# Patient Record
Sex: FEMALE | Race: WHITE | NOT HISPANIC OR LATINO | Employment: OTHER | URBAN - METROPOLITAN AREA
[De-identification: names, ages, dates, MRNs, and addresses within clinical notes are randomized per-mention and may not be internally consistent; named-entity substitution may affect disease eponyms.]

---

## 2017-06-15 ENCOUNTER — OFFICE VISIT (OUTPATIENT)
Dept: NEUROSURGERY | Facility: CLINIC | Age: 82
End: 2017-06-15

## 2017-06-15 VITALS
WEIGHT: 116.9 LBS | HEIGHT: 60 IN | DIASTOLIC BLOOD PRESSURE: 71 MMHG | HEART RATE: 61 BPM | SYSTOLIC BLOOD PRESSURE: 146 MMHG | BODY MASS INDEX: 22.95 KG/M2

## 2017-06-15 DIAGNOSIS — G50.0 IDIOPATHIC TRIGEMINAL NEURALGIA: ICD-10-CM

## 2017-06-15 DIAGNOSIS — G50.0 IDIOPATHIC TRIGEMINAL NEURALGIA: Primary | ICD-10-CM

## 2017-06-15 LAB
ALBUMIN SERPL-MCNC: 3.8 G/DL (ref 3.4–5)
ALP SERPL-CCNC: 71 U/L (ref 40–150)
ALT SERPL W P-5'-P-CCNC: 19 U/L (ref 0–50)
ANION GAP SERPL CALCULATED.3IONS-SCNC: 7 MMOL/L (ref 3–14)
AST SERPL W P-5'-P-CCNC: 18 U/L (ref 0–45)
BASOPHILS # BLD AUTO: 0 10E9/L (ref 0–0.2)
BASOPHILS NFR BLD AUTO: 0.4 %
BILIRUB SERPL-MCNC: 0.6 MG/DL (ref 0.2–1.3)
BUN SERPL-MCNC: 12 MG/DL (ref 7–30)
CALCIUM SERPL-MCNC: 8.8 MG/DL (ref 8.5–10.1)
CHLORIDE SERPL-SCNC: 105 MMOL/L (ref 94–109)
CO2 SERPL-SCNC: 28 MMOL/L (ref 20–32)
CREAT SERPL-MCNC: 0.86 MG/DL (ref 0.52–1.04)
DIFFERENTIAL METHOD BLD: NORMAL
EOSINOPHIL # BLD AUTO: 0.1 10E9/L (ref 0–0.7)
EOSINOPHIL NFR BLD AUTO: 2 %
ERYTHROCYTE [DISTWIDTH] IN BLOOD BY AUTOMATED COUNT: 12 % (ref 10–15)
GFR SERPL CREATININE-BSD FRML MDRD: 63 ML/MIN/1.7M2
GLUCOSE SERPL-MCNC: 88 MG/DL (ref 70–99)
HCT VFR BLD AUTO: 41.1 % (ref 35–47)
HGB BLD-MCNC: 13.6 G/DL (ref 11.7–15.7)
IMM GRANULOCYTES # BLD: 0 10E9/L (ref 0–0.4)
IMM GRANULOCYTES NFR BLD: 0.2 %
LYMPHOCYTES # BLD AUTO: 1.4 10E9/L (ref 0.8–5.3)
LYMPHOCYTES NFR BLD AUTO: 31.5 %
MCH RBC QN AUTO: 29.8 PG (ref 26.5–33)
MCHC RBC AUTO-ENTMCNC: 33.1 G/DL (ref 31.5–36.5)
MCV RBC AUTO: 90 FL (ref 78–100)
MONOCYTES # BLD AUTO: 0.4 10E9/L (ref 0–1.3)
MONOCYTES NFR BLD AUTO: 9.2 %
NEUTROPHILS # BLD AUTO: 2.5 10E9/L (ref 1.6–8.3)
NEUTROPHILS NFR BLD AUTO: 56.7 %
NRBC # BLD AUTO: 0 10*3/UL
NRBC BLD AUTO-RTO: 0 /100
PLATELET # BLD AUTO: 176 10E9/L (ref 150–450)
POTASSIUM SERPL-SCNC: 4.2 MMOL/L (ref 3.4–5.3)
PROT SERPL-MCNC: 7 G/DL (ref 6.8–8.8)
RBC # BLD AUTO: 4.57 10E12/L (ref 3.8–5.2)
SODIUM SERPL-SCNC: 140 MMOL/L (ref 133–144)
WBC # BLD AUTO: 4.5 10E9/L (ref 4–11)

## 2017-06-15 RX ORDER — ASPIRIN 81 MG/1
81 TABLET, CHEWABLE ORAL DAILY
COMMUNITY
Start: 2015-08-04 | End: 2017-06-15

## 2017-06-15 RX ORDER — BACLOFEN 10 MG/1
5 TABLET ORAL 2 TIMES DAILY
COMMUNITY
Start: 2015-08-04 | End: 2017-06-15

## 2017-06-15 RX ORDER — OXCARBAZEPINE 150 MG/1
150 TABLET, FILM COATED ORAL DAILY
COMMUNITY
Start: 2015-08-04 | End: 2017-06-15

## 2017-06-15 RX ORDER — CARBAMAZEPINE 200 MG/1
100 TABLET ORAL 2 TIMES DAILY
Qty: 90 TABLET | Refills: 1 | Status: SHIPPED | OUTPATIENT
Start: 2017-06-15 | End: 2019-09-12 | Stop reason: DRUGHIGH

## 2017-06-15 RX ORDER — ONDANSETRON 4 MG/1
4 TABLET, ORALLY DISINTEGRATING ORAL 3 TIMES DAILY PRN
COMMUNITY
Start: 2015-08-04 | End: 2017-06-15

## 2017-06-15 ASSESSMENT — ENCOUNTER SYMPTOMS
NUMBNESS: 0
SEIZURES: 0
SPEECH CHANGE: 0
MEMORY LOSS: 0
HEADACHES: 1
PARALYSIS: 0
LOSS OF CONSCIOUSNESS: 0
TINGLING: 0
WEAKNESS: 0
DIZZINESS: 1
DISTURBANCES IN COORDINATION: 0

## 2017-06-15 ASSESSMENT — PAIN SCALES - GENERAL: PAINLEVEL: EXTREME PAIN (8)

## 2017-06-15 NOTE — MR AVS SNAPSHOT
After Visit Summary   6/15/2017    Tonia Meeks    MRN: [id number]           Patient Information     Date Of Birth          4/24/1934        Visit Information        Provider Department      6/15/2017 9:00 AM Kailee Acuna APRN CNP [facility] Neurosurgery        Today's Diagnoses     Idiopathic trigeminal neuralgia    -  1      Care Instructions    1. Please complete your lab work today on the first floor.     2. Please start your Tegretol and call [facility] care coordinator in 4 days. [phone].     3. Please follow-up in clinic in 4 weeks.           Follow-ups after your visit        Follow-up notes from your care team     Return in about 4 weeks (around 7/13/2017).      Your next 10 appointments already scheduled     Bruno 15, 2017 10:30 AM CDT   LAB with [facility])    [address] [place] [id number] [phone]           Patient must bring picture ID.  Patient should be prepared to give a urine specimen  Please do not eat 10-12 hours before your appointment if you are coming in fasting for labs on lipids, cholesterol, or glucose (sugar).  Pregnant women should follow their Care Team instructions. Water with medications is okay. Do not drink coffee or other fluids.   If you have concerns about taking  your medications, please ask at office or if scheduling via [employer], send a message by clicking on Secure Messaging, Message Your Care Team.            Jul 12, 2017 10:00 AM CDT   (Arrive by 9:45 AM)   Return Visit with KENYA Garcia CNP   [facility]surgery ([facility])    [address] [place] [id number] [phone]              Future tests that were ordered for you today     Open Future Orders        Priority Expected Expires Ordered    CBC with platelets differential Routine  6/15/2018 6/15/2017    Comprehensive metabolic panel Routine   6/15/2018 6/15/2017            Who to contact     Please call your clinic at [phone] to:    Ask questions about your health    Make or cancel appointments    Discuss your medicines    Learn about your test results    Speak to your doctor   If you have compliments or concerns about an experience at your clinic, or if you wish to file a complaint, please contact [facility] Patient Relations at [phone] or email us at [email or web address]         Additional Information About Your Visit        [employer] Information     [employer] is an electronic gateway that provides easy, online access to your medical records. With [employer], you can request a clinic appointment, read your test results, renew a prescription or communicate with your care team.     To sign up for [employer] visit the website at [email or web address]   You will be asked to enter the access code listed below, as well as some personal information. Please follow the directions to create your username and password.     Your access code is: [email or web address]  Expires: 9/10/2017  6:31 AM     Your access code will  in 90 days. If you need help or a new code, please contact your [facility] or call [phone] for assistance.        Care EveryWhere ID     This is your Care EveryWhere ID. This could be used by other organizations to access your [facility] medical records  [id number]        Your Vitals Were     Pulse Height BMI (Body Mass Index)             61 1.524 m (5') 22.83 kg/m2          Blood Pressure from Last 3 Encounters:   06/15/17 146/71   12/17/15 156/72   12/15/15 179/73    Weight from Last 3 Encounters:   06/15/17 53 kg (116 lb 14.4 oz)   12/17/15 55.2 kg (121 lb 11.1 oz)   12/15/15 55.2 kg (121 lb 9.6 oz)                 Today's Medication Changes          These changes are accurate as of: 6/15/17 10:18 AM.  If you have any questions, ask your nurse or doctor.                Start taking these medicines.        Dose/Directions    carBAMazepine 200 MG tablet   Commonly known as:  TEGretol   Used for:  Idiopathic trigeminal neuralgia   Started by:  Kailee Acuna APRN CNP        Dose:  100 mg   Take 0.5 tablets (100 mg) by mouth 2 times daily   Quantity:  90 tablet   Refills:  1            Where to get your medicines      These medications were [place] [facility] [place] - [address]  [address], [facility] [address]     Phone:  [phone]     carBAMazepine 200 MG tablet                Primary Care Provider    None Specified       No primary provider on file.        Thank you!     Thank you for choosing [facility]  for your care. Our goal is always to provide you with excellent care. Hearing back from our patients is one way we can continue to improve our services. Please take a few minutes to complete the written survey that you may receive in the mail after your visit with us. Thank you!             Your Updated Medication List - Protect others around you: Learn how to safely use, store and throw away your medicines at www.disposemymeds.org.          This list is accurate as of: 6/15/17 10:18 AM.  Always use your most recent med list.                   Brand Name Dispense Instructions for use    ATORVASTATIN CALCIUM PO      Take by mouth daily       carBAMazepine 200 MG tablet    TEGretol    90 tablet    Take 0.5 tablets (100 mg) by mouth 2 times daily       CLOPIDOGREL BISULFATE PO      Take by mouth daily       LISINOPRIL PO      Take by mouth daily       METOPROLOL TARTRATE PO      Take by mouth daily

## 2017-06-15 NOTE — PROGRESS NOTES
Neurosurgery Progress Note    Tonia Meeks MRN# [id number]   YOB: 1934 Age: 83 year old   Date of Service: 06/15/17       I had the pleasure of seeing Tonia Meeks and her  in neurosurgery clinic at her request regarding her recurrent right-sided V1 trigeminal neuralgia (TN).    This is a 83 year old female, who was initially referred to our office by her PCP, Shari Padron MD for idiopathic right-sided trigeminal neuralgia in 12/2015. She was suffering from the condition since about 2010 and eventually failed pharmacologic management. She saw Dr. Salas and electively underwent balloon compression on 12/17/2015. She had excellent relief from the procedure until about a week ago when the paroxysmal lancinating pain reoccurred in the frontal region of her head and lateral to her right eye. Triggers include chewing, sneezing, yawning and just about any facial movements. She denies residual facial numbness or alteration in sensation in the right eye subsequent to balloon compression. She denies pain in the right eye and/or constant background pain in the right side of her face. She said she took a fall down the stairs at home in 9/2016 and sustained left rigt fracture, thoracic spine (T3-T6), and right wrist fractures. In addition, she developed MI and was found to have CAD and underwent cardiac stent as well. She denies LOC and does not remeber if she has had an imaging done for her brain since she had many tests/ studies done after the mechanical fall. She was hospitalized at [facility] for 2 weeks for all above workup and surgeries and was in rehab for a while before she was d/c home. She wondered if her recurrent TN is related to the fall. She is here to discuss about treatment options for her recurrent TN.          Past Medical History:     Past Medical History:   Diagnosis Date     CAD (coronary artery disease)      Fracture of right wrist 09/2016    from a fall      Fracture, ribs 09/2016    from a fall     Hyperlipidemia      Hypertension      MI (myocardial infarction) (H)      Thoracic spine fracture (H) 09/2016            Past Surgical History:     Past Surgical History:   Procedure Laterality Date     APPENDECTOMY       BALLOON COMPRESSION RHIZOTOMY Right 12/17/2015    Procedure: BALLOON COMPRESSION RHIZOTOMY;  Surgeon: Nahid Salas MD;  Location: [facility] OR     ENT SURGERY      tonsillectomy     EYE SURGERY      cataracts     GYN SURGERY      hysterectomy     HC ECP WITH CATARACT SURGERY Bilateral      STENT       thoracic spine fusion with instrumentation  09/2016    due to a fall            Social History:     Social History     Marital status:      Spouse name: N/A     Number of children: N/A     Years of education: N/A     Social History Main Topics     Smoking status: Never Smoker     Smokeless tobacco: Not on file     Alcohol use 0.0 oz/week     3-4 Standard drinks or equivalent per week      Comment: couple of drinks/week     Drug use: No     Sexual activity: Not on file            Family History:   History reviewed. No pertinent family history.            Allergies:     Allergen Reactions     Sulfa Drugs Unknown          Medications:     Current Outpatient Prescriptions:      LISINOPRIL PO, Take by mouth daily, Disp: , Rfl:      CLOPIDOGREL BISULFATE PO, Take by mouth daily, Disp: , Rfl:      METOPROLOL TARTRATE PO, Take by mouth daily, Disp: , Rfl:      ATORVASTATIN CALCIUM PO, Take by mouth daily, Disp: , Rfl:      carBAMazepine (TEGRETOL) 200 MG tablet, Take 0.5 tablets (100 mg) by mouth 2 times daily, Disp: 90 tablet, Rfl: 1              Review of Systems:   The 10 point Review of Systems is negative other than noted in the HPI and at the end of the note.           Physical Exam:   /71 (BP Location: Left arm, Patient Position: Sitting, Cuff Size: Adult Regular)  Pulse 61  Ht 1.524 m (5')  Wt 53 kg (116 lb 14.4 oz)  BMI 22.83  kg/m2    Patient Supplied Answers To the  Pain Questionnaire  [facility] Pain -  Patient Entered Questionnaire/Answers 6/15/2017   What number best describes your pain right now:  0 = No pain  to  10 = Worst pain imaginable 8   How would you describe the pain? sharp, pressure   Which of the following worsen your pain? coughing / sneezing   HEENT: Head is normocephalic and atraumatic. Neck is supple without adenopathy or thyromegaly. Palpation of her bilateral temporomandibular joint shows no popping, clicking, tenderness and/or swelling. Conjunctivae are anicteric. Oropharynx and nasopharynx are without exudate and/or erythema. Inspection of her oral cavity shows no obvious ulcerations and/or lesions.   Lungs: Clear to auscultation.   Cardiovascular: Heart rate is regular with S1, S2 and no extra sounds. She has no JVD.          Focused Neurologic Exam:   She is alert, oriented and cooperative. She answers questions and follows commands appropriately in fluent speech and normal tone. Memory and fund of knowledge are normal. Bilateral pupils are round, equal and reactive to light and accommodation. Extraocular movements are intact with no nystagmus and/or disconjugation. Visual fields to direct confrontation are full. Corneal reflexes are positive bilaterally. Masseter muscle strength is normal. Facial sensation and expression are symmetric. Hearing is to  baseline bilaterally. Gag reflex is present. Tongue and uvula are midline with normal palate movements and no fasciculation. Trapezius and sternocleidal strength is equivocal. She has no finger-to-nose dysmetria. Romberg test is negative.   Strength in all 4 extremities is 5/5. Deep tendon reflexes are symmetric. Finger-nose-finger, heel-to-shin rub and rapid alternating finger and hand taps are normal. Tandem walk is normal, as are her station and gait.             Data:   Head CT report from 9/16 done at [facility] were reviewed:  FINDINGS:  Minimal soft tissue  swelling with a small scalp hematoma in the left   frontal scalp. No skull fracture. No intracranial hemorrhage. Mild diffuse   intraparenchymal volume loss. Small hypoattenuating foci in the bilateral   corona radiata, indicative of old small chronic infarcts. No finding to   suggest an acute infarction. While there is a relative hyperdensity of the   left tentorial leaflet, this appears to be secondary to small   calcifications. Additional thin calcifications along the falx.   Calcification at the distal internal carotid arteries bilaterally. Masslike  focus of extra-axial CSF attenuation overlying the posterior left frontal   lobe, nonspecific on CT but suggestive of an arachnoid cyst or epidermoid   cyst. Postoperative changes to the bilateral lenses.  Lab Results   Component Value Date    WBC 4.5 06/15/2017    HGB 13.6 06/15/2017    HCT 41.1 06/15/2017     06/15/2017     06/15/2017    POTASSIUM 4.2 06/15/2017    CHLORIDE 105 06/15/2017    CO2 28 06/15/2017    BUN 12 06/15/2017    CR 0.86 06/15/2017    GLC 88 06/15/2017    AST 18 06/15/2017    ALT 19 06/15/2017    ALKPHOS 71 06/15/2017    BILITOTAL 0.6 06/15/2017            Assessment and Recommendation:   Recurrent Type I right-sided trigeminal neuralgia in V1 distribution     I told the patient and her  that I do not think her recurrent TN is related to the fall since the symptom only started one week ago or 9 months after the fall. Her neurologic examination is normal today. I was able to retrieve her head CT report from [facility]. The findings described above under Data are not correlated with her recurrent right-sided trigeminal neuralgia     We have discussed about pharmacologic treatment. We also talked about several less invasive procedures such as percutaneous radiofrequency rhizotomy, balloon compression, glycerol rhizolysis and stereotactic gamma knife radiosurgery. I told her that these are all outpatient procedures can be  repeated. I would however recommend repeating the balloon compression for her recurrent first division trigeminal neuralgia, because unlike radiofrequency, the corneal numbness is better tolerated and the corneal reflex is often preserved. I told her that the initial success rate from balloon compression is about 70% with a medium term recurrence rate ranging between 1.5 to 2 years. Potential complications associated with balloon compression include, but are not limited to, dysesthesia (with 0.2- 4 percent of anesthesia dolorosa), meningitis, alterations in salivation, alterations in lacrimation, masseter muscle weakness, ocular paresis, reduced hearing, neuroparalytic keratitis and possible failure to respond. I informed her that the initial pain reduction rate for the stereotactic radiosurgery is between 80% and 96%, but only 55% become pain-free while the remaining percentage would need to stay on the medications to achieve total pain relief. The median latency to pain relief from the stereotactic radiosurgery can take 4 weeks to 3 months, which is suboptimal for patients who need immediate pain relief. Hypesthesia occurs in 20% after initial stereotactic radiosurgery.     In the end, I suggest that we start treating her pain with medication such as Tegretol first. If her pain is not under control by medication and/ or if she develops intolerable side effects, I can facilitate her meeting with the surgeon to further discuss about and schedule the surgery. She is agreeable to this. I reviewed her BMP, CBC, and LFT level from today, which are all normal. I will start her on Tegretol 100 mg BID x 4 days and then increase to 100 mg TID after that. I have asked her to give us a progress report in 4 days before she increase the dosage. She was instructed to call us or [facility] after hours to reach our resident on-call if her pain continues to escalate before then. I will plan to see her back in 4 weeks for a followup.      Thank you very much for allowing us to participate in the care of this patient. Please do not hesitate to contact us with questions.       Kailee Acuna DNP, APRN, FNP-BC  [facility]  Department of Neurosurgery  Phone: [phone]  Fax: [phone]  Answers for HPI/ROS submitted by the patient on 6/15/2017   General Symptoms: No  Skin Symptoms: No  HENT Symptoms: No  EYE SYMPTOMS: No  HEART SYMPTOMS: No  LUNG SYMPTOMS: No  INTESTINAL SYMPTOMS: No  URINARY SYMPTOMS: No  GYNECOLOGIC SYMPTOMS: No  BREAST SYMPTOMS: No  SKELETAL SYMPTOMS: No  BLOOD SYMPTOMS: No  NERVOUS SYSTEM SYMPTOMS: Yes  MENTAL HEALTH SYMPTOMS: No  Trouble with coordination: No  Dizziness or trouble with balance: Yes  Fainting or black-out spells: No  Memory loss: No  Headache: Yes  Seizures: No  Speech problems: No  Tingling: No  Weakness: No  Difficulty walking: No  Paralysis: No  Numbness: No

## 2017-06-15 NOTE — NURSING NOTE
Chief Complaint   Patient presents with     Consult     [facility] - Trigeminal Pain     Antoinette Novak MA

## 2017-06-15 NOTE — LETTER
6/15/2017       RE: Tonia Meeks  [address]  [place] [address]     Dear Colleague,    Thank you for referring your patient, Tonia Meeks, to the [facility] NEUROSURGERY at [facility]. Please see a copy of my visit note below.    Neurosurgery Progress Note    Tonia Meeks MRN# [id number]   YOB: 1934 Age: 83 year old   Date of Service: 06/15/17     I had the pleasure of seeing Tonia Meeks and her  in neurosurgery clinic at her request regarding her recurrent right-sided V1 trigeminal neuralgia (TN).    This is a 83 year old female, who was initially referred to our office by her PCP, Shari Padron MD for idiopathic right-sided trigeminal neuralgia in 12/2015. She was suffering from the condition since about 2010 and eventually failed pharmacologic management. She saw Dr. Salas and electively underwent balloon compression on 12/17/2015. She had excellent relief from the procedure until about a week ago when the paroxysmal lancinating pain reoccurred in the frontal region of her head and lateral to her right eye. Triggers include chewing, sneezing, yawning and just about any facial movements. She denies residual facial numbness or alteration in sensation in the right eye subsequent to balloon compression. She denies pain in the right eye and/or constant background pain in the right side of her face. She said she took a fall down the stairs at home in 9/2016 and sustained left rigt fracture, thoracic spine (T3-T6), and right wrist fractures. In addition, she developed MI and was found to have CAD and underwent cardiac stent as well. She denies LOC and does not remeber if she has had an imaging done for her brain since she had many tests/ studies done after the mechanical fall. She was hospitalized at [facility] for 2 weeks for all above workup and surgeries and was in rehab for a while before she was d/c home. She wondered if  her recurrent TN is related to the fall. She is here to discuss about treatment options for her recurrent TN.          Past Medical History:     Past Medical History:   Diagnosis Date     CAD (coronary artery disease)      Fracture of right wrist 09/2016    from a fall     Fracture, ribs 09/2016    from a fall     Hyperlipidemia      Hypertension      MI (myocardial infarction) (H)      Thoracic spine fracture (H) 09/2016            Past Surgical History:     Past Surgical History:   Procedure Laterality Date     APPENDECTOMY       BALLOON COMPRESSION RHIZOTOMY Right 12/17/2015    Procedure: BALLOON COMPRESSION RHIZOTOMY;  Surgeon: Nahid Salas MD;  Location: [facility] OR     ENT SURGERY      tonsillectomy     EYE SURGERY      cataracts     GYN SURGERY      hysterectomy     HC ECP WITH CATARACT SURGERY Bilateral      STENT       thoracic spine fusion with instrumentation  09/2016    due to a fall            Social History:     Social History     Marital status:      Spouse name: N/A     Number of children: N/A     Years of education: N/A     Social History Main Topics     Smoking status: Never Smoker     Smokeless tobacco: Not on file     Alcohol use 0.0 oz/week     3-4 Standard drinks or equivalent per week      Comment: couple of drinks/week     Drug use: No     Sexual activity: Not on file            Family History:   History reviewed. No pertinent family history.            Allergies:     Allergen Reactions     Sulfa Drugs Unknown          Medications:     Current Outpatient Prescriptions:      LISINOPRIL PO, Take by mouth daily, Disp: , Rfl:      CLOPIDOGREL BISULFATE PO, Take by mouth daily, Disp: , Rfl:      METOPROLOL TARTRATE PO, Take by mouth daily, Disp: , Rfl:      ATORVASTATIN CALCIUM PO, Take by mouth daily, Disp: , Rfl:      carBAMazepine (TEGRETOL) 200 MG tablet, Take 0.5 tablets (100 mg) by mouth 2 times daily, Disp: 90 tablet, Rfl: 1              Review of Systems:   The 10 point Review of  Systems is negative other than noted in the HPI and at the end of the note.           Physical Exam:   /71 (BP Location: Left arm, Patient Position: Sitting, Cuff Size: Adult Regular)  Pulse 61  Ht 1.524 m (5')  Wt 53 kg (116 lb 14.4 oz)  BMI 22.83 kg/m2    Patient Supplied Answers To the  Pain Questionnaire  [facility] Pain -  Patient Entered Questionnaire/Answers 6/15/2017   What number best describes your pain right now:  0 = No pain  to  10 = Worst pain imaginable 8   How would you describe the pain? sharp, pressure   Which of the following worsen your pain? coughing / sneezing   HEENT: Head is normocephalic and atraumatic. Neck is supple without adenopathy or thyromegaly. Palpation of her bilateral temporomandibular joint shows no popping, clicking, tenderness and/or swelling. Conjunctivae are anicteric. Oropharynx and nasopharynx are without exudate and/or erythema. Inspection of her oral cavity shows no obvious ulcerations and/or lesions.   Lungs: Clear to auscultation.   Cardiovascular: Heart rate is regular with S1, S2 and no extra sounds. She has no JVD.          Focused Neurologic Exam:   She is alert, oriented and cooperative. She answers questions and follows commands appropriately in fluent speech and normal tone. Memory and fund of knowledge are normal. Bilateral pupils are round, equal and reactive to light and accommodation. Extraocular movements are intact with no nystagmus and/or disconjugation. Visual fields to direct confrontation are full. Corneal reflexes are positive bilaterally. Masseter muscle strength is normal. Facial sensation and expression are symmetric. Hearing is to  baseline bilaterally. Gag reflex is present. Tongue and uvula are midline with normal palate movements and no fasciculation. Trapezius and sternocleidal strength is equivocal. She has no finger-to-nose dysmetria. Romberg test is negative.   Strength in all 4 extremities is 5/5. Deep tendon reflexes are symmetric.  Finger-nose-finger, heel-to-shin rub and rapid alternating finger and hand taps are normal. Tandem walk is normal, as are her station and gait.             Data:   Head CT report from 9/16 done at [facility] [address] were reviewed:  FINDINGS:  Minimal soft tissue swelling with a small scalp hematoma in the left   frontal scalp. No skull fracture. No intracranial hemorrhage. Mild diffuse   intraparenchymal volume loss. Small hypoattenuating foci in the bilateral   corona radiata, indicative of old small chronic infarcts. No finding to   suggest an acute infarction. While there is a relative hyperdensity of the   left tentorial leaflet, this appears to be secondary to small   calcifications. Additional thin calcifications along the falx.   Calcification at the distal internal carotid arteries bilaterally. Masslike  focus of extra-axial CSF attenuation overlying the posterior left frontal   lobe, nonspecific on CT but suggestive of an arachnoid cyst or epidermoid   cyst. Postoperative changes to the bilateral lenses.  Lab Results   Component Value Date    WBC 4.5 06/15/2017    HGB 13.6 06/15/2017    HCT 41.1 06/15/2017     06/15/2017     06/15/2017    POTASSIUM 4.2 06/15/2017    CHLORIDE 105 06/15/2017    CO2 28 06/15/2017    BUN 12 06/15/2017    CR 0.86 06/15/2017    GLC 88 06/15/2017    AST 18 06/15/2017    ALT 19 06/15/2017    ALKPHOS 71 06/15/2017    BILITOTAL 0.6 06/15/2017            Assessment and Recommendation:   Recurrent Type I right-sided trigeminal neuralgia in V1 distribution     I told the patient and her  that I do not think her recurrent TN is related to the fall since the symptom only started one week ago or 9 months after the fall. Her neurologic examination is normal today. I was able to retrieve her head CT report from [facility] The findings described above under Data are not correlated with her recurrent right-sided trigeminal neuralgia     We have discussed about  pharmacologic treatment. We also talked about several less invasive procedures such as percutaneous radiofrequency rhizotomy, balloon compression, glycerol rhizolysis and stereotactic gamma knife radiosurgery. I told her that these are all outpatient procedures can be repeated. I would however recommend repeating the balloon compression for her recurrent first division trigeminal neuralgia, because unlike radiofrequency, the corneal numbness is better tolerated and the corneal reflex is often preserved. I told her that the initial success rate from balloon compression is about 70% with a medium term recurrence rate ranging between 1.5 to 2 years. Potential complications associated with balloon compression include, but are not limited to, dysesthesia (with 0.2- 4 percent of anesthesia dolorosa), meningitis, alterations in salivation, alterations in lacrimation, masseter muscle weakness, ocular paresis, reduced hearing, neuroparalytic keratitis and possible failure to respond. I informed her that the initial pain reduction rate for the stereotactic radiosurgery is between 80% and 96%, but only 55% become pain-free while the remaining percentage would need to stay on the medications to achieve total pain relief. The median latency to pain relief from the stereotactic radiosurgery can take 4 weeks to 3 months, which is suboptimal for patients who need immediate pain relief. Hypesthesia occurs in 20% after initial stereotactic radiosurgery.     In the end, I suggest that we start treating her pain with medication such as Tegretol first. If her pain is not under control by medication and/ or if she develops intolerable side effects, I can facilitate her meeting with the surgeon to further discuss about and schedule the surgery. She is agreeable to this. I reviewed her BMP, CBC, and LFT level from today, which are all normal. I will start her on Tegretol 100 mg BID x 4 days and then increase to 100 mg TID after that. I  have asked her to give us a progress report in 4 days before she increase the dosage. She was instructed to call us or [facility] after hours to reach our resident on-call if her pain continues to escalate before then. I will plan to see her back in 4 weeks for a followup.     Thank you very much for allowing us to participate in the care of this patient. Please do not hesitate to contact us with questions.     KENYA Dasilva CNP

## 2017-06-21 ENCOUNTER — TELEPHONE (OUTPATIENT)
Dept: NEUROSURGERY | Facility: CLINIC | Age: 82
End: 2017-06-21

## 2017-06-21 NOTE — TELEPHONE ENCOUNTER
Called patient to check in to see how she is doing with starting the Tegretol. Patient states that she has noticed a small difference in the amount of facial pain attacks she is having. She reports that she is having some dizziness after starting the Tegretol. Discussed with SAMM Dugan and she would like patient to increase in 100 mg three times daily. Patient would like to continue with the 100mg two times daily until Friday to see if she continues to gain additional relief instead of increasing due to the dizziness. Kailee is agreeable to this plan. Writer will follow-up with patient on Friday. Patient was advised to call with further questions or concerns.     Bessie Beasley RN

## 2017-06-27 ENCOUNTER — TELEPHONE (OUTPATIENT)
Dept: NEUROSURGERY | Facility: CLINIC | Age: 82
End: 2017-06-27

## 2017-06-27 NOTE — TELEPHONE ENCOUNTER
Called to check in with patient and she how she is doing with Tegretol. Patient states that her facial pain has improved but not completely relieved. Patient did report some dizziness when writer spoke with her last week. She continues to report some minor dizziness that has not worsened. She denies any issues with gait or balance. Will update Kailee Acuna DNP. Will have patient continue with the Tegretol 100 mg BID and she will follow-up in clinic as previously scheduled on 7/12. Patient was advised to call sooner with any new or worsening symptoms.     Bessie Beasley RN

## 2017-07-13 ENCOUNTER — OFFICE VISIT (OUTPATIENT)
Dept: NEUROSURGERY | Facility: CLINIC | Age: 82
End: 2017-07-13

## 2017-07-13 VITALS
HEIGHT: 60 IN | BODY MASS INDEX: 22.78 KG/M2 | WEIGHT: 116 LBS | HEART RATE: 65 BPM | DIASTOLIC BLOOD PRESSURE: 75 MMHG | SYSTOLIC BLOOD PRESSURE: 139 MMHG

## 2017-07-13 DIAGNOSIS — G50.0 TRIGEMINAL NEURALGIA: Primary | ICD-10-CM

## 2017-07-13 NOTE — MR AVS SNAPSHOT
After Visit Summary   7/13/2017    Tonia Meeks    MRN: [id number]           Patient Information     Date Of Birth          4/24/1934        Visit Information        Provider Department      7/13/2017 10:00 AM Kailee Acuna APRN CNP [facility] Neurosurgery        Today's Diagnoses     Trigeminal neuralgia    -  1      Care Instructions    1. Please complete your lab work at your primary care office in September. They will fax the results to our clinic. You will need to complete this lab work every 3 months.     2. Please call our clinic with further questions or concerns. [phone]          Follow-ups after your visit        Follow-up notes from your care team     Return if symptoms worsen or fail to improve.      Future tests that were ordered for you today     Open Standing Orders        Priority Remaining Interval Expires Ordered    CBC with platelets differential Routine 4/4 every 3 months 7/13/2018 7/13/2017    Comprehensive metabolic panel Routine 4/4 every 3 months 7/13/2018 7/13/2017            Who to contact     Please call your clinic at [phone] to:    Ask questions about your health    Make or cancel appointments    Discuss your medicines    Learn about your test results    Speak to your doctor   If you have compliments or concerns about an experience at your clinic, or if you wish to file a complaint, please contact [facility] Patient Relations at [phone] or email us at [email or web address]         Additional Information About Your Visit        MyChart Information     [employer]rat is an electronic gateway that provides easy, online access to your medical records. With [employer], you can request a clinic appointment, read your test results, renew a prescription or communicate with your care team.     To sign up for [employer] visit the website at [email or web address]   You will be asked to enter the access code listed below, as  well as some personal information. Please follow the directions to create your username and password.     Your access code is: [email or web address]  Expires: 9/10/2017  6:31 AM     Your access code will  in 90 days. If you need help or a new code, please contact your [facility] or call [phone] for assistance.        Care EveryWhere ID     This is your Care EveryWhere ID. This could be used by other organizations to access your [facility] medical records  [id number]        Your Vitals Were     Pulse Height BMI (Body Mass Index)             65 1.524 m (5') 22.65 kg/m2          Blood Pressure from Last 3 Encounters:   17 139/75   06/15/17 146/71   12/17/15 156/72    Weight from Last 3 Encounters:   17 52.6 kg (116 lb)   06/15/17 53 kg (116 lb 14.4 oz)   12/17/15 55.2 kg (121 lb 11.1 oz)               Primary Care Provider    None Specified       No primary provider on file.        Equal Access to Services     [facility]: Hadii sukh torres hadasho Soviviana, waaxda luqadaha, qaybta kaalmada adeegyasharon, brian remy . So [facility] [phone].    ATENCIÓN: Si habla español, tiene a dumont disposición servicios gratuitos de asistencia lingüística. Llame al [phone].    We comply with applicable federal civil rights laws and [place] laws. We do not discriminate on the basis of race, color, national origin, age, disability sex, sexual orientation or gender identity.            Thank you!     Thank you for choosing [facility]  for your care. Our goal is always to provide you with excellent care. Hearing back from our patients is one way we can continue to improve our services. Please take a few minutes to complete the written survey that you may receive in the mail after your visit with us. Thank you!             Your Updated Medication List - Protect others around you: Learn how to safely use, store and throw away your medicines at  www.disposemymeds.org.          This list is accurate as of: 7/13/17 12:06 PM.  Always use your most recent med list.                   Brand Name Dispense Instructions for use Diagnosis    ATORVASTATIN CALCIUM PO      Take by mouth daily        carBAMazepine 200 MG tablet    TEGretol    90 tablet    Take 0.5 tablets (100 mg) by mouth 2 times daily    Idiopathic trigeminal neuralgia       CLOPIDOGREL BISULFATE PO      Take by mouth daily        LISINOPRIL PO      Take by mouth daily        METOPROLOL TARTRATE PO      Take by mouth daily

## 2017-07-13 NOTE — LETTER
7/13/2017       RE: Tonia Meeks  [address] [place] [id number]     Dear Colleague,    Thank you for referring your patient, Tonia Meeks, to the [facility] NEUROSURGERY at [facility]. Please see a copy of my visit note below.    REASON FOR VISIT: Followup/    Postop followup    Chief Complaint   Patient presents with     RECHECK     Recurrent right-sided V1 trigeminal neuralgia       HISTORY OF PRESENT ILLNESS:  Ms. Meeks is a 83 year old female with significant history of right-sided V1 trigeminal neuralgia and s/p balloon compression by my colleague Dr. Salas in 12/2015. She returns to our office today for further followup of her recurrent trigemina neuralgia after I started her on low dose Tegretol (100 mg BID) 4 weeks ago.     The patient reports improvement from Tegretol. She has only had a few episodes of mild pain attacks on top of her right head only when she chews foods; this occurs about two days per week on average. She has developed xerostomia since she started the Tegretol. Initially, she also felt drowsy, but it has gotten better overtime. She is content with her current status and does not wish to increase or decrease the medication dosage.       INTERVAL HEALTH HISTORY:  Past Medical History:   Diagnosis Date     CAD (coronary artery disease)      Fracture of right wrist 09/2016    from a fall     Fracture, ribs 09/2016    from a fall     Hyperlipidemia      Hypertension      Macular degeneration, bilateral      MI (myocardial infarction) (H)      Thoracic spine fracture (H) 09/2016       CURRENT MEDICATIONS:  Current Outpatient Prescriptions   Medication Sig Dispense Refill     LISINOPRIL PO Take by mouth daily       CLOPIDOGREL BISULFATE PO Take by mouth daily       METOPROLOL TARTRATE PO Take by mouth daily       ATORVASTATIN CALCIUM PO Take by mouth daily       carBAMazepine (TEGRETOL) 200 MG tablet Take 0.5 tablets (100 mg) by mouth 2 times  daily 90 tablet 1       ALLERGIES:  Sulfa drugs      REVIEW OF SYSTEMS:  10 point ROS neg other than the symptoms noted above in the HPI and PMH.      PHYSICAL EXAMINATION:  Filed Vitals:  /75  Pulse 65  Ht 1.524 m (5')  Wt 52.6 kg (116 lb)  BMI 22.65 kg/m2     Patient Supplied Answers To the [facility] Pain Questionnaire  [facility] Pain -  Patient Entered Questionnaire/Answers 7/13/2017   What number best describes your pain right now:  0 = No pain  to  10 = Worst pain imaginable 2   How would you describe the pain? dull, aching   Which of the following worsen your pain? -   Which of the following improve or reduce your pain?  nothing relieves the pain   What number best describes your LOWEST pain in past 24 hours:  0 = No pain  to  10 = Worst pain imaginable 2   What number best describes your WORST pain in past 24 hours:  0 = No pain  to  10 = Worst pain imaginable 3   When is your pain worst? Constant   What non-medicine treatments have you already had for your pain? none   Have you tried treating your pain with medication?  Yes   Are you currently taking medications for your pain? Yes     Appearance: Comfortable and relaxed  HENT:   Normocephalic  Neck:   Normal ROM. Supple  Cardiovascular:   Normal heart rate. Normal rhythm  Pulmonary/Chest Wall:   Effort normal. Breath sounds normal  LOC and Cognition:  Normal:   Alert and oriented to time, person and place for age, Appropriate and fluent speech for age, Follows commands appropriately for age and Affect pleasant, behavior cooperative  Cranial Nerves: Complete II-XII is grossly normal.      ASSESSMENT: Stable recurrent right-sided trigeminal neuralgia in V1 distribution.      RECOMMENDATIONS:   1. Continue Tegretol 100 mg BID. Patient was instructed to local lab for a set of CMP and CBC in 9/2017. We will review her lab results and her progress over the phone once that is completed. She was advised to call our office if the pain is no longer under control by  current dose at anytime. She agreed.  2. Discuss about options of increasing the Tegretol or repeating the balloon compression next if she develops acute pain exacerbation and/or if she is unable to tolerate side effects from higher dosage of Tegretol.    Again, thank you for allowing me to participate in the care of your patient.      Sincerely,    KENYA Dasilva CNP

## 2017-07-13 NOTE — PATIENT INSTRUCTIONS
1. Please complete your lab work at your primary care office in September. They will fax the results to our clinic. You will need to complete this lab work every 3 months.     2. Please call our clinic with further questions or concerns. [phone]

## 2017-07-13 NOTE — PROGRESS NOTES
REASON FOR VISIT: Followup/    Postop followup    Chief Complaint   Patient presents with     RECHECK     Recurrent right-sided V1 trigeminal neuralgia       HISTORY OF PRESENT ILLNESS:  Ms. Meeks is a 83 year old female with significant history of right-sided V1 trigeminal neuralgia and s/p balloon compression by my colleague Dr. Salas in 12/2015. She returns to our office today for further followup of her recurrent trigemina neuralgia after I started her on low dose Tegretol (100 mg BID) 4 weeks ago.     The patient reports improvement from Tegretol. She has only had a few episodes of mild pain attacks on top of her right head only when she chews foods; this occurs about two days per week on average. She has developed xerostomia since she started the Tegretol. Initially, she also felt drowsy, but it has gotten better overtime. She is content with her current status and does not wish to increase or decrease the medication dosage.       INTERVAL HEALTH HISTORY:  Past Medical History:   Diagnosis Date     CAD (coronary artery disease)      Fracture of right wrist 09/2016    from a fall     Fracture, ribs 09/2016    from a fall     Hyperlipidemia      Hypertension      Macular degeneration, bilateral      MI (myocardial infarction) (H)      Thoracic spine fracture (H) 09/2016       CURRENT MEDICATIONS:  Current Outpatient Prescriptions   Medication Sig Dispense Refill     LISINOPRIL PO Take by mouth daily       CLOPIDOGREL BISULFATE PO Take by mouth daily       METOPROLOL TARTRATE PO Take by mouth daily       ATORVASTATIN CALCIUM PO Take by mouth daily       carBAMazepine (TEGRETOL) 200 MG tablet Take 0.5 tablets (100 mg) by mouth 2 times daily 90 tablet 1       ALLERGIES:  Sulfa drugs      REVIEW OF SYSTEMS:  10 point ROS neg other than the symptoms noted above in the HPI and PMH.      PHYSICAL EXAMINATION:  Filed Vitals:  /75  Pulse 65  Ht 1.524 m (5')  Wt 52.6 kg (116 lb)  BMI 22.65 kg/m2     Patient  Supplied Answers To the [facility] Pain Questionnaire  [facility] Pain -  Patient Entered Questionnaire/Answers 7/13/2017   What number best describes your pain right now:  0 = No pain  to  10 = Worst pain imaginable 2   How would you describe the pain? dull, aching   Which of the following worsen your pain? -   Which of the following improve or reduce your pain?  nothing relieves the pain   What number best describes your LOWEST pain in past 24 hours:  0 = No pain  to  10 = Worst pain imaginable 2   What number best describes your WORST pain in past 24 hours:  0 = No pain  to  10 = Worst pain imaginable 3   When is your pain worst? Constant   What non-medicine treatments have you already had for your pain? none   Have you tried treating your pain with medication?  Yes   Are you currently taking medications for your pain? Yes     Appearance: Comfortable and relaxed  HENT:   Normocephalic  Neck:   Normal ROM. Supple  Cardiovascular:   Normal heart rate. Normal rhythm  Pulmonary/Chest Wall:   Effort normal. Breath sounds normal  LOC and Cognition:  Normal:   Alert and oriented to time, person and place for age, Appropriate and fluent speech for age, Follows commands appropriately for age and Affect pleasant, behavior cooperative  Cranial Nerves: Complete II-XII is grossly normal.      ASSESSMENT: Stable recurrent right-sided trigeminal neuralgia in V1 distribution.      RECOMMENDATIONS:   1. Continue Tegretol 100 mg BID. Patient was instructed to local lab for a set of CMP and CBC in 9/2017. We will review her lab results and her progress over the phone once that is completed. She was advised to call our office if the pain is no longer under control by current dose at anytime. She agreed.  2. Discuss about options of increasing the Tegretol or repeating the balloon compression next if she develops acute pain exacerbation and/or if she is unable to tolerate side effects from higher dosage of Tegretol.      Kailee Acuna, DNP, APRN,  FNP-[facility]  Department of Neurosurgery  Phone: [phone]  Fax: [phone]

## 2019-09-12 ENCOUNTER — OFFICE VISIT (OUTPATIENT)
Dept: NEUROSURGERY | Facility: CLINIC | Age: 84
End: 2019-09-12
Payer: COMMERCIAL

## 2019-09-12 VITALS
WEIGHT: 120.3 LBS | BODY MASS INDEX: 23.49 KG/M2 | OXYGEN SATURATION: 98 % | RESPIRATION RATE: 16 BRPM | DIASTOLIC BLOOD PRESSURE: 58 MMHG | SYSTOLIC BLOOD PRESSURE: 157 MMHG | HEART RATE: 75 BPM

## 2019-09-12 DIAGNOSIS — G50.0 TRIGEMINAL NEURALGIA: Primary | ICD-10-CM

## 2019-09-12 DIAGNOSIS — G50.0 TRIGEMINAL NEURALGIA: ICD-10-CM

## 2019-09-12 LAB
ALBUMIN SERPL-MCNC: 3.7 G/DL (ref 3.4–5)
ALP SERPL-CCNC: 97 U/L (ref 40–150)
ALT SERPL W P-5'-P-CCNC: 21 U/L (ref 0–50)
ANION GAP SERPL CALCULATED.3IONS-SCNC: 4 MMOL/L (ref 3–14)
AST SERPL W P-5'-P-CCNC: 16 U/L (ref 0–45)
BASOPHILS # BLD AUTO: 0 10E9/L (ref 0–0.2)
BASOPHILS NFR BLD AUTO: 0.5 %
BILIRUB SERPL-MCNC: 0.3 MG/DL (ref 0.2–1.3)
BUN SERPL-MCNC: 13 MG/DL (ref 7–30)
CALCIUM SERPL-MCNC: 9 MG/DL (ref 8.5–10.1)
CHLORIDE SERPL-SCNC: 104 MMOL/L (ref 94–109)
CO2 SERPL-SCNC: 29 MMOL/L (ref 20–32)
CREAT SERPL-MCNC: 0.89 MG/DL (ref 0.52–1.04)
DIFFERENTIAL METHOD BLD: NORMAL
EOSINOPHIL # BLD AUTO: 0.1 10E9/L (ref 0–0.7)
EOSINOPHIL NFR BLD AUTO: 2 %
ERYTHROCYTE [DISTWIDTH] IN BLOOD BY AUTOMATED COUNT: 12.5 % (ref 10–15)
GFR SERPL CREATININE-BSD FRML MDRD: 59 ML/MIN/{1.73_M2}
GLUCOSE SERPL-MCNC: 105 MG/DL (ref 70–99)
HCT VFR BLD AUTO: 42 % (ref 35–47)
HGB BLD-MCNC: 13.7 G/DL (ref 11.7–15.7)
IMM GRANULOCYTES # BLD: 0 10E9/L (ref 0–0.4)
IMM GRANULOCYTES NFR BLD: 0.2 %
LYMPHOCYTES # BLD AUTO: 1.8 10E9/L (ref 0.8–5.3)
LYMPHOCYTES NFR BLD AUTO: 31.5 %
MCH RBC QN AUTO: 29.7 PG (ref 26.5–33)
MCHC RBC AUTO-ENTMCNC: 32.6 G/DL (ref 31.5–36.5)
MCV RBC AUTO: 91 FL (ref 78–100)
MONOCYTES # BLD AUTO: 0.5 10E9/L (ref 0–1.3)
MONOCYTES NFR BLD AUTO: 8.9 %
NEUTROPHILS # BLD AUTO: 3.2 10E9/L (ref 1.6–8.3)
NEUTROPHILS NFR BLD AUTO: 56.9 %
NRBC # BLD AUTO: 0 10*3/UL
NRBC BLD AUTO-RTO: 0 /100
PLATELET # BLD AUTO: 172 10E9/L (ref 150–450)
POTASSIUM SERPL-SCNC: 4 MMOL/L (ref 3.4–5.3)
PROT SERPL-MCNC: 6.9 G/DL (ref 6.8–8.8)
RBC # BLD AUTO: 4.62 10E12/L (ref 3.8–5.2)
SODIUM SERPL-SCNC: 137 MMOL/L (ref 133–144)
WBC # BLD AUTO: 5.6 10E9/L (ref 4–11)

## 2019-09-12 RX ORDER — CARBAMAZEPINE 100 MG/1
50 TABLET, CHEWABLE ORAL 3 TIMES DAILY
Qty: 45 TABLET | Refills: 0 | Status: SHIPPED | OUTPATIENT
Start: 2019-09-12 | End: 2019-10-16

## 2019-09-12 ASSESSMENT — PAIN SCALES - GENERAL: PAINLEVEL: MODERATE PAIN (5)

## 2019-09-12 NOTE — PATIENT INSTRUCTIONS
Take Carbamazepine (Tegretol) 50 mg (1/2 of 100 mg tablet) 3 times per day.    Proceed to the lab today for blood work. Please note, keya will need to go to lab for routine blood work for as long as you are on Tegretol.    Follow up in Neurosurgery in 4-6 weeks.    Call [phone] for concerns or questions.

## 2019-09-12 NOTE — PROGRESS NOTES
Neurosurgery Progress Note      Reason for Visit: Recurrent right-sided V1 trigeminal neuralgia.(TN)    History of Present Illness: This is a 83 year old female, who was initially referred to our office by her PCP, Shari Padron MD for idiopathic right-sided trigeminal neuralgia in 12/2015. She was suffering from the condition since about 2010 and eventually failed pharmacologic management. She saw Dr. Salas and electively underwent balloon compression on 12/17/2015. She had excellent relief from the procedure until about 7/2017 when the paroxysmal lancinating pain reoccurred in the frontal region of her head and lateral to her right eye. We started her on low dose Tegretol 100 mg BID. She had excellent pain relief and then at some point she began to take it on as needed basis.     Interval History: The patient states that she was getting along well until about 6 months ago when she developed recurrence in the same area as described above. Over the past 2-3 weeks,the pain attacks progressed to daily whenever she chews, sneezes, coughs, and brushes teeth. She started taking Tegretol that she kept from 2 years ago more frequently, but not daily, about 2 weeks ago. She felt that Tegretol was not effective. She denied residual facial numbness or alteration in sensation in the right eye subsequent to balloon compression. She denied pain in the right eye and/or constant background pain in the right side of her face. She would like advice regarding management of her recurrent TN.            Past Medical History:   Atrial fibrillation, gait instability, CAD, macular degeneration, melanoma, hypertension, thoracic spine fracture, encephalopathy, hyponatremia, low back pain, osteoporosis, CVA, memory loss, and trigeminal neuralgia.             Past Surgical History:   Cardiac stent, hysterectomy, appendectomy, left elbow fracture repair, tonsillectomy, cataract removal from left eye, instrumented spinal fusion, surgery  for kyphosis T1-T7.             Social History:   She is  and lives with her spouse at home. She is retired. She denies history of tobacco, ETOH and illicit drug usage.           Family History:   Cardiac disease.           Allergies:   Sulfa          Medications:      LISINOPRIL PO, 5 mg daily, Disp: , Rfl:      METOPROLOL TARTRATE PO, 25 mg daily, Disp: , Rfl:      ATORVASTATIN CALCIUM PO, 40 mg daily, Disp: , Rfl:      carBAMazepine (TEGRETOL) 200 MG tablet, Take 0.5 tablets (100 mg) by mouth 2 times daily, Disp: 90 tablet,               Review of Systems:   The 10 point Review of Systems is negative other than noted in the HPI and at the end of the note.           Physical Exam:   BP (!) 157/58 (BP Location: Right arm, Patient Position: Sitting, Cuff Size: Adult Regular)   Pulse 75   Resp 16   Wt 54.6 kg (120 lb 4.8 oz)   SpO2 98%   BMI 23.49 kg/m      Patient Supplied Answers To the  Pain Questionnaire  [facility] Pain -  Patient Entered Questionnaire/Answers 7/13/2017   What number best describes your pain right now:  0 = No pain  to  10 = Worst pain imaginable 2   How would you describe the pain? dull, aching   Which of the following worsen your pain? -   Which of the following improve or reduce your pain?  nothing relieves the pain   What number best describes your LOWEST pain in past 24 hours:  0 = No pain  to  10 = Worst pain imaginable 2   What number best describes your WORST pain in past 24 hours:  0 = No pain  to  10 = Worst pain imaginable 3   When is your pain worst? Constant   What non-medicine treatments have you already had for your pain? none   Have you tried treating your pain with medication?  Yes   Are you currently taking medications for your pain? Yes   HEENT: Head is normocephalic and atraumatic. Neck is supple without adenopathy or thyromegaly. Palpation of her bilateral temporomandibular joint shows no popping, clicking, tenderness and/or swelling. Conjunctivae are anicteric.  Oropharynx and nasopharynx are without exudate and/or erythema. Inspection of her oral cavity shows no obvious ulcerations and/or lesions.   Lungs: Clear to auscultation.   Cardiovascular: Heart rate is regular with S1, S2 and no extra sounds. She has no JVD.   Neurologic: She is alert, oriented and cooperative.  She is able to answer questions and follows commands.  Memory and fund of knowledge are appropriate for age.  Cranial nerves II through XII are grossly intact.   Strength in all 4 extremities is 5/5.  Sensation to light touch is intact.           Data:     Lab Results   Component Value Date    WBC 5.6 2019    HGB 13.7 2019    HCT 42.0 2019     2019     2019    POTASSIUM 4.0 2019    CHLORIDE 104 2019    CO2 29 2019    BUN 13 2019    CR 0.89 2019     (H) 2019    AST 16 2019    ALT 21 2019    ALKPHOS 97 2019    BILITOTAL 0.3 2019            Assessment and Recommendation:   Recurrent Type I right-sided trigeminal neuralgia in V1 distribution     We talked about pharmacologic treatment and repeat balloon compression as a back-up option if she failed to respond to medications. She has not been taking Tegretol consistently and also her medication is long .  I therefore recommended that we start a new prescription of low-dose Tegretol 50 mg 3 times daily and then gradually titrate the dosage based on her response and side effects if present.  We did get a baseline CBC with differential and CMP which are within normal limits prior to restarting her on Tegretol.  I have asked her to call our office if she has any concerns and/or questions.  Otherwise, I will plan to see her back in 4 to 6 weeks.      Thank you very much for allowing us to participate in the care of this patient. Please do not hesitate to contact us with questions.     Greater than 50% of the 60 minutes I spent in counseling, education,  and care coordination for the problem outlined above.     Kailee Acuna, DNP, APRN, FNP-BC  Department of Neurosurgery

## 2019-09-12 NOTE — LETTER
9/12/2019       RE: Tonia Meeks  [address] [place] [id number]     Dear Colleague,    Thank you for referring your patient, Tonia Meeks, to the [facility] NEUROSURGERY at [facility]. Please see a copy of my visit note below.    Neurosurgery Progress Note    Reason for Visit: Recurrent right-sided V1 trigeminal neuralgia.(TN)    History of Present Illness: This is a 83 year old female, who was initially referred to our office by her PCP, Shari Padron MD for idiopathic right-sided trigeminal neuralgia in 12/2015. She was suffering from the condition since about 2010 and eventually failed pharmacologic management. She saw Dr. Salas and electively underwent balloon compression on 12/17/2015. She had excellent relief from the procedure until about 7/2017 when the paroxysmal lancinating pain reoccurred in the frontal region of her head and lateral to her right eye. We started her on low dose Tegretol 100 mg BID. She had excellent pain relief and then at some point she began to take it on as needed basis.     Interval History: The patient states that she was getting along well until about 6 months ago when she developed recurrence in the same area as described above. Over the past 2-3 weeks,the pain attacks progressed to daily whenever she chews, sneezes, coughs, and brushes teeth. She started taking Tegretol that she kept from 2 years ago more frequently, but not daily, about 2 weeks ago. She felt that Tegretol was not effective. She denied residual facial numbness or alteration in sensation in the right eye subsequent to balloon compression. She denied pain in the right eye and/or constant background pain in the right side of her face. She would like advice regarding management of her recurrent TN.          Past Medical History:   Atrial fibrillation, gait instability, CAD, macular degeneration, melanoma, hypertension, thoracic spine fracture, encephalopathy,  hyponatremia, low back pain, osteoporosis, CVA, memory loss, and trigeminal neuralgia.         Past Surgical History:   Cardiac stent, hysterectomy, appendectomy, left elbow fracture repair, tonsillectomy, cataract removal from left eye, instrumented spinal fusion, surgery for kyphosis T1-T7.         Social History:   She is  and lives with her spouse at home. She is retired. She denies history of tobacco, ETOH and illicit drug usage.         Family History:   Cardiac disease.         Allergies:   Sulfa          Medications:      LISINOPRIL PO, 5 mg daily, Disp: , Rfl:      METOPROLOL TARTRATE PO, 25 mg daily, Disp: , Rfl:      ATORVASTATIN CALCIUM PO, 40 mg daily, Disp: , Rfl:      carBAMazepine (TEGRETOL) 200 MG tablet, Take 0.5 tablets (100 mg) by mouth 2 times daily, Disp: 90 tablet,            Review of Systems:   The 10 point Review of Systems is negative other than noted in the HPI and at the end of the note.         Physical Exam:   BP (!) 157/58 (BP Location: Right arm, Patient Position: Sitting, Cuff Size: Adult Regular)   Pulse 75   Resp 16   Wt 54.6 kg (120 lb 4.8 oz)   SpO2 98%   BMI 23.49 kg/m       Patient Supplied Answers To the [facility] Pain Questionnaire  [facility] Pain -  Patient Entered Questionnaire/Answers 7/13/2017   What number best describes your pain right now:  0 = No pain  to  10 = Worst pain imaginable 2   How would you describe the pain? dull, aching   Which of the following worsen your pain? -   Which of the following improve or reduce your pain?  nothing relieves the pain   What number best describes your LOWEST pain in past 24 hours:  0 = No pain  to  10 = Worst pain imaginable 2   What number best describes your WORST pain in past 24 hours:  0 = No pain  to  10 = Worst pain imaginable 3   When is your pain worst? Constant   What non-medicine treatments have you already had for your pain? none   Have you tried treating your pain with medication?  Yes   Are you currently taking  medications for your pain? Yes   HEENT: Head is normocephalic and atraumatic. Neck is supple without adenopathy or thyromegaly. Palpation of her bilateral temporomandibular joint shows no popping, clicking, tenderness and/or swelling. Conjunctivae are anicteric. Oropharynx and nasopharynx are without exudate and/or erythema. Inspection of her oral cavity shows no obvious ulcerations and/or lesions.   Lungs: Clear to auscultation.   Cardiovascular: Heart rate is regular with S1, S2 and no extra sounds. She has no JVD.   Neurologic: She is alert, oriented and cooperative.  She is able to answer questions and follows commands.  Memory and fund of knowledge are appropriate for age.  Cranial nerves II through XII are grossly intact.   Strength in all 4 extremities is 5/5.  Sensation to light touch is intact.         Data:     Lab Results   Component Value Date    WBC 5.6 2019    HGB 13.7 2019    HCT 42.0 2019     2019     2019    POTASSIUM 4.0 2019    CHLORIDE 104 2019    CO2 29 2019    BUN 13 2019    CR 0.89 2019     (H) 2019    AST 16 2019    ALT 21 2019    ALKPHOS 97 2019    BILITOTAL 0.3 2019            Assessment and Recommendation:   Recurrent Type I right-sided trigeminal neuralgia in V1 distribution     We talked about pharmacologic treatment and repeat balloon compression as a back-up option if she failed to respond to medications. She has not been taking Tegretol consistently and also her medication is long .  I therefore recommended that we start a new prescription of low-dose Tegretol 50 mg 3 times daily and then gradually titrate the dosage based on her response and side effects if present.  We did get a baseline CBC with differential and CMP which are within normal limits prior to restarting her on Tegretol.  I have asked her to call our office if she has any concerns and/or questions.   Otherwise, I will plan to see her back in 4 to 6 weeks.      Thank you very much for allowing us to participate in the care of this patient. Please do not hesitate to contact us with questions.     Greater than 50% of the 60 minutes I spent in counseling, education, and care coordination for the problem outlined above.     Kailee Acuna DNP, APRN, FNP-BC  Department of Neurosurgery

## 2019-10-16 DIAGNOSIS — G50.0 TRIGEMINAL NEURALGIA: ICD-10-CM

## 2019-10-16 RX ORDER — CARBAMAZEPINE 100 MG/1
50 TABLET, CHEWABLE ORAL 3 TIMES DAILY
Qty: 45 TABLET | Refills: 0 | Status: SHIPPED | OUTPATIENT
Start: 2019-10-16 | End: 2020-02-05

## 2019-10-28 ENCOUNTER — PATIENT OUTREACH (OUTPATIENT)
Dept: NEUROSURGERY | Facility: CLINIC | Age: 84
End: 2019-10-28

## 2019-10-28 DIAGNOSIS — G50.0 TRIGEMINAL NEURALGIA: Primary | ICD-10-CM

## 2019-10-28 NOTE — PROGRESS NOTES
Patient calling with Right sided forehead discomfort that comes and goes, severe pain when yawning, blowing nose,  It can cause pain during the night as well.  LOV 9/12/19 with NP Armand Recurrent Type 1 right sided trigeminal neuralgia in V1 distribution.  Started on  Tegretol 50mg TID.  Pt states medication worked for 2-3 weeks, then had to stop due to eye blurriness.  States medication did help but unable to tolerate.    Pt asking for repeat balloon compression as back up due to unable to tolerate medication.  Last procedure balloon compression on 12/17/2015.      Pt has my name and number, will review with Dr Salas and get back to patient.

## 2019-11-05 NOTE — PROGRESS NOTES
Left detailed message for patient, please schedule appointment with Dr Conklni, as Dr Salas retiring, can be seen to schedule appropriate procedure.

## 2019-11-18 NOTE — PROGRESS NOTES
Patient returning call, explained would need a new patient appointment with Dr Conklin for Trigeminal discomfort.    Pt approved, will send note to the schedulers to call for appointment..    Voices understanding.

## 2019-12-17 ENCOUNTER — ANCILLARY PROCEDURE (OUTPATIENT)
Dept: MRI IMAGING | Facility: CLINIC | Age: 84
End: 2019-12-17
Attending: NEUROLOGICAL SURGERY
Payer: COMMERCIAL

## 2019-12-17 ENCOUNTER — OFFICE VISIT (OUTPATIENT)
Dept: NEUROSURGERY | Facility: CLINIC | Age: 84
End: 2019-12-17
Payer: COMMERCIAL

## 2019-12-17 VITALS — SYSTOLIC BLOOD PRESSURE: 153 MMHG | HEART RATE: 83 BPM | DIASTOLIC BLOOD PRESSURE: 75 MMHG | OXYGEN SATURATION: 96 %

## 2019-12-17 DIAGNOSIS — G50.0 TRIGEMINAL NEURALGIA: Primary | ICD-10-CM

## 2019-12-17 DIAGNOSIS — G50.0 TRIGEMINAL NEURALGIA: ICD-10-CM

## 2019-12-17 LAB
CREAT BLD-MCNC: 0.9 MG/DL (ref 0.5–1.2)
GFR SERPL CREATININE-BSD FRML MDRD: NORMAL ML/MIN/{1.73_M2}
GFRB: NORMAL

## 2019-12-17 RX ORDER — GADOBUTROL 604.72 MG/ML
0.1 INJECTION INTRAVENOUS ONCE
Status: COMPLETED | OUTPATIENT
Start: 2019-12-17 | End: 2019-12-17

## 2019-12-17 RX ADMIN — GADOBUTROL: 604.72 INJECTION INTRAVENOUS at 13:55

## 2019-12-17 ASSESSMENT — PAIN SCALES - GENERAL: PAINLEVEL: SEVERE PAIN (7)

## 2019-12-17 NOTE — LETTER
RE: Tonia Meeks  [address] [place] [id number]     Dear Colleague,    Thank you for referring your patient, Tonia Meeks, to the [facility] NEUROSURGERY at [facility]. Please see a copy of my visit note below.    12/17/2019  Neurosurgery Clinic Visit    History of present illness: Tonia Meeks is a 85 year old female with a history of right-sided trigeminal neuralgia status post balloon rhizotomy in 2015 with Dr. Salas.  She had good relief with the rhizotomy for almost 4 years.  She started having a return of her trigeminal neuralgia 6 months ago. Her pain is in the right V2 distribution this time. She has been inconsistent with taking her Tegretol.  She is interested in having a repeat procedure to manage her recurrence of facial pain.    Past Medical History:   Past Medical History:   Diagnosis Date     CAD (coronary artery disease)      Fracture of right wrist 09/2016    from a fall     Fracture, ribs 09/2016    from a fall     Hyperlipidemia      Hypertension      Macular degeneration, bilateral      MI (myocardial infarction) (H)      Thoracic spine fracture (H) 09/2016       Surgical History:   Past Surgical History:   Procedure Laterality Date     APPENDECTOMY       BALLOON COMPRESSION RHIZOTOMY Right 12/17/2015    Procedure: BALLOON COMPRESSION RHIZOTOMY;  Surgeon: Nahid Salas MD;  Location: [facility] OR     ENT SURGERY      tonsillectomy     EYE SURGERY      cataracts     GYN SURGERY      hysterectomy     HC ECP WITH CATARACT SURGERY Bilateral      STENT       thoracic spine fusion with instrumentation  09/2016    due to a fall       Social history:   Social History     Tobacco Use     Smoking status: Never Smoker     Smokeless tobacco: Never Used   Substance Use Topics     Alcohol use: Yes     Alcohol/week: 0.0 standard drinks     Comment: couple of drinks/week     Drug use: No       Family history:   No family history on  file.    Medications:  Current Outpatient Medications   Medication     ATORVASTATIN CALCIUM PO     carBAMazepine (TEGRETOL) 100 MG chewable tablet     CLOPIDOGREL BISULFATE PO     LISINOPRIL PO     METOPROLOL TARTRATE PO     No current facility-administered medications for this visit.        Allergies:     Allergies   Allergen Reactions     Sulfa Drugs Unknown       Review of systems: 10 point ROS negative except for as detailed in HPI    Physical exam:   BP (!) 153/75 (Patient Position: Sitting)   Pulse 83   SpO2 96%     General: Awake and alert and in no acute distress.  Pulm: Breathing comfortably on room air  CN: Symmetric browlift, smile, tongue protrusion, palate elevation, and trapezius. No dysarthria. Extraocular muscles are all intact.  Motor: Good muscle bulk throughout.  Sensation: Sensation grossly intact to light touch in all extremities.  Gait: Intact tandem gait.    Assessment:  #85 year old female with a right-sided trigeminal neuralgia that has responded well previously to a balloon rhizotomy with recurrence of right-sided facial pain in the V2 distribution    Plan:  -Schedule for repeat right sided balloon rhizotomy    Patient seen and discussed with MD Monroe Aleman MD  Neurosurgery Resident PGY-1    Again, thank you for allowing me to participate in the care of your patient.      Sincerely,    Cipriano Conklin MD

## 2019-12-17 NOTE — PROGRESS NOTES
12/17/2019  Neurosurgery Clinic Visit    History of present illness: Tonia Meeks is a 85 year old female with a history of right-sided trigeminal neuralgia status post balloon rhizotomy in 2015 with Dr. Salas.  She had good relief with the rhizotomy for almost 4 years.  She started having a return of her trigeminal neuralgia 6 months ago. Her pain is in the right V2 distribution this time. She has been inconsistent with taking her Tegretol.  She is interested in having a repeat procedure to manage her recurrence of facial pain.    Past Medical History:   Past Medical History:   Diagnosis Date     CAD (coronary artery disease)      Fracture of right wrist 09/2016    from a fall     Fracture, ribs 09/2016    from a fall     Hyperlipidemia      Hypertension      Macular degeneration, bilateral      MI (myocardial infarction) (H)      Thoracic spine fracture (H) 09/2016       Surgical History:   Past Surgical History:   Procedure Laterality Date     APPENDECTOMY       BALLOON COMPRESSION RHIZOTOMY Right 12/17/2015    Procedure: BALLOON COMPRESSION RHIZOTOMY;  Surgeon: Nahid Salas MD;  Location: [facility] OR     ENT SURGERY      tonsillectomy     EYE SURGERY      cataracts     GYN SURGERY      hysterectomy     HC ECP WITH CATARACT SURGERY Bilateral      STENT       thoracic spine fusion with instrumentation  09/2016    due to a fall       Social history:   Social History     Tobacco Use     Smoking status: Never Smoker     Smokeless tobacco: Never Used   Substance Use Topics     Alcohol use: Yes     Alcohol/week: 0.0 standard drinks     Comment: couple of drinks/week     Drug use: No       Family history:   No family history on file.    Medications:  Current Outpatient Medications   Medication     ATORVASTATIN CALCIUM PO     carBAMazepine (TEGRETOL) 100 MG chewable tablet     CLOPIDOGREL BISULFATE PO     LISINOPRIL PO     METOPROLOL TARTRATE PO     No current facility-administered medications for this visit.         Allergies:     Allergies   Allergen Reactions     Sulfa Drugs Unknown       Review of systems: 10 point ROS negative except for as detailed in HPI    Physical exam:   BP (!) 153/75 (Patient Position: Sitting)   Pulse 83   SpO2 96%     General: Awake and alert and in no acute distress.  Pulm: Breathing comfortably on room air  CN: Symmetric browlift, smile, tongue protrusion, palate elevation, and trapezius. No dysarthria. Extraocular muscles are all intact.  Motor: Good muscle bulk throughout.  Sensation: Sensation grossly intact to light touch in all extremities.  Gait: Intact tandem gait.    Assessment:  #85 year old female with a right-sided trigeminal neuralgia that has responded well previously to a balloon rhizotomy with recurrence of right-sided facial pain in the V2 distribution    Plan:  -Schedule for repeat right sided balloon rhizotomy    Patient seen and discussed with MD Monroe Aleman MD  Neurosurgery Resident PGY-1

## 2019-12-17 NOTE — PATIENT INSTRUCTIONS
Procedure to be scheduled with Dr Conklin, Balloon Rhizotomy of the Right Trigeminal Nerve    Pre Anesthesia appointment will be made prior to procedure    Call [facility]  [phone] for questions/concerns.

## 2020-01-13 ENCOUNTER — TELEPHONE (OUTPATIENT)
Dept: NEUROSURGERY | Facility: CLINIC | Age: 85
End: 2020-01-13

## 2020-01-13 DIAGNOSIS — G50.0 TRIGEMINAL NEURALGIA: Primary | ICD-10-CM

## 2020-01-13 NOTE — TELEPHONE ENCOUNTER
Daughter Veronica calling to check on procedure date, explained still waiting on date.  Pt to do pre op at PCP office, will fax [facility] form when date is set.  Will callback  and Veronica with information.  Voices understanding.

## 2020-01-27 ENCOUNTER — TELEPHONE (OUTPATIENT)
Dept: NEUROSURGERY | Facility: CLINIC | Age: 85
End: 2020-01-27

## 2020-01-27 NOTE — TELEPHONE ENCOUNTER
Spoke with patient, Procedure Right Balloon Rhizotomy.  PCP appointment completed today for H&P.    Patient states she is NOT on ASA or Plavix, (hx of both).    Reviewed No food 8 hours prior, clear liquids up to 2 hours prior, Showering night prior and morning of washing hair.  Arrival time is 7am [facility] area 3C.       parking at hospital [address]  All questions reviewed and patient states ready for procedure.  Pt has my name and number if needed.

## 2020-01-29 ENCOUNTER — ANESTHESIA EVENT (OUTPATIENT)
Dept: SURGERY | Facility: CLINIC | Age: 85
End: 2020-01-29
Payer: COMMERCIAL

## 2020-01-29 ASSESSMENT — ENCOUNTER SYMPTOMS: DYSRHYTHMIAS: 1

## 2020-01-29 NOTE — ANESTHESIA PREPROCEDURE EVALUATION
Anesthesia Pre-Procedure Evaluation    Patient: Tonia Meeks   MRN:     [id number] Gender:   female   Age:    85 year old :      1934        Preoperative Diagnosis: Trigeminal neuralgia [G50.0]   Procedure(s):  ANESTHESIA OUT OF OR rhizotomy     Past Medical History:   Diagnosis Date     CAD (coronary artery disease)      Fracture of right wrist 2016    from a fall     Fracture, ribs 2016    from a fall     Hyperlipidemia      Hypertension      Macular degeneration, bilateral      MI (myocardial infarction) (H)      Thoracic spine fracture (H) 2016     Trigeminal neuralgia       Past Surgical History:   Procedure Laterality Date     APPENDECTOMY       BALLOON COMPRESSION RHIZOTOMY Right 2015    Procedure: BALLOON COMPRESSION RHIZOTOMY;  Surgeon: Nahid Salas MD;  Location: [facility] OR     CARDIAC SURGERY      stent     ENT SURGERY      tonsillectomy     EYE SURGERY      cataracts     GYN SURGERY      hysterectomy     HC ECP WITH CATARACT SURGERY Bilateral      STENT       thoracic spine fusion with instrumentation  2016    due to a fall          Anesthesia Evaluation     . Pt has had prior anesthetic. Type: General    No history of anesthetic complications          ROS/MED HX    ENT/Pulmonary:       Neurologic: Comment: Hx encephalopathy    (+)neuropathy - CN V, CVA     Cardiovascular:     (+) Dyslipidemia, hypertension--CAD, -past MI (STEMI 2016 ),-. : . CHF etiology: ICM at time of STEMI 2016 . . :. dysrhythmias (after STEMI ) a-fib, . Previous cardiac testing Echodate:16results: Normal LV size.     Low normal LV systolic function, EF 50-55%.     Normal RV size and function.     Mild tricuspid regurgitation.     Side by side comparison with previous echo 16 shows marked     improvement in wall motion and EF.  HR has decreased form 100     BPM to 60BPM.      Left Ventricular Ejection Fraction: 50-55 %date: results:ECG reviewed date: results:SB 57 no  changeCath date: 9/12/16 results:  1) Subtotal, thrombotic occlusion of proximal LAD with         GAUTAM II flow (culprit).        2) Distal LAD with 40-50% stenosis.        3) Moderate disease proximal LCx, 60% stenosis mid LCx.        4) Significant, but stable appearing 75% lesion         proximal RCA, mid portion with diffuse moderate disease         and focal 60% stenosis.        5) LVEDP mildly elevated  15mmHg.        6) Uneventful PCI of proximal LAD with 3.5x12mm Synergy         ARA.           METS/Exercise Tolerance:     Hematologic:         Musculoskeletal: Comment: S/P t1-t7 fusion         GI/Hepatic:         Renal/Genitourinary:         Endo:         Psychiatric:         Infectious Disease:         Malignancy:   (+) Malignancy History of Skin  Skin CA Remission status post Surgery,         Other:                         PHYSICAL EXAM:   Mental Status/Neuro: A/A/O   Airway: Facies: Feasible  Mallampati: III  Mouth/Opening: Full  TM distance: > 6 cm  Neck ROM: Limited   Respiratory: Auscultation: CTAB     Resp. Rate: Normal     Resp. Effort: Normal      CV: Rhythm: Regular  Rate: Age appropriate  Heart: Normal Sounds  Edema: None   Comments:      Dental: Normal Dentition                LABS:  CBC:   Lab Results   Component Value Date    WBC 5.6 09/12/2019    WBC 4.5 06/15/2017    HGB 13.7 09/12/2019    HGB 13.6 06/15/2017    HCT 42.0 09/12/2019    HCT 41.1 06/15/2017     09/12/2019     06/15/2017     BMP:   Lab Results   Component Value Date     09/12/2019     06/15/2017    POTASSIUM 4.0 09/12/2019    POTASSIUM 4.2 06/15/2017    CHLORIDE 104 09/12/2019    CHLORIDE 105 06/15/2017    CO2 29 09/12/2019    CO2 28 06/15/2017    BUN 13 09/12/2019    BUN 12 06/15/2017    CR 0.89 09/12/2019    CR 0.86 06/15/2017     (H) 09/12/2019    GLC 88 06/15/2017     COAGS: No results found for: PTT, INR, FIBR  POC: No results found for: BGM, HCG, HCGS  OTHER:   Lab Results   Component Value  Date    NALDO 9.0 09/12/2019    ALBUMIN 3.7 09/12/2019    PROTTOTAL 6.9 09/12/2019    ALT 21 09/12/2019    AST 16 09/12/2019    ALKPHOS 97 09/12/2019    BILITOTAL 0.3 09/12/2019        Preop Vitals    BP Readings from Last 3 Encounters:   12/17/19 (!) 153/75   09/12/19 (!) 157/58   07/13/17 139/75    Pulse Readings from Last 3 Encounters:   12/17/19 83   09/12/19 75   07/13/17 65      Resp Readings from Last 3 Encounters:   09/12/19 16   12/17/15 16   12/15/15 12    SpO2 Readings from Last 3 Encounters:   12/17/19 96%   09/12/19 98%   12/17/15 95%      Temp Readings from Last 1 Encounters:   12/17/15 36.5  C (97.7  F) (Oral)    Ht Readings from Last 1 Encounters:   07/13/17 1.524 m (5')      Wt Readings from Last 1 Encounters:   09/12/19 54.6 kg (120 lb 4.8 oz)    Estimated body mass index is 23.49 kg/m  as calculated from the following:    Height as of 7/13/17: 1.524 m (5').    Weight as of 9/12/19: 54.6 kg (120 lb 4.8 oz).     LDA:        Assessment:   ASA SCORE: 3    H&P: History and physical reviewed and following examination; no interval change.   Smoking Status:  Non-Smoker/Unknown   NPO Status: NPO Appropriate     Plan:   Anes. Type:  General   Pre-Medication: None   Induction:  IV (Standard)   Airway: ETT   Access/Monitoring: PIV   Maintenance: Balanced     Postop Plan:   Postop Pain: Opioids  Postop Sedation/Airway: Not planned  Disposition: Outpatient     PONV Management:   Adult Risk Factors: Female, Non-Smoker, Postop Opioids   Prevention: Ondansetron     CONSENT: Direct conversation   Plan and risks discussed with: Patient   Blood Products: Consent Deferred (Minimal Blood Loss)       Comments for Plan/Consent:  Placement Date: 09/16/16; Placement Time: 0749; Placed By: CRNA; Induction Type: Pre-O2, IV, RSI; ETT Type: ETT; Orientation: Right; Depth Secured (cm): 21 cm; Cuffed: Cuffed; Cuff Volume: 6 mL; Intubation Method: Video laryngoscopy (patient remains in neutral positon cspine precautions used);  Cormack_Lehane Glottic Grade: Grade 1; Glottic View: Cords Open, Cords Clear; Suctioned: Oropharnyx, ETT; Removal Date: 09/18/16; Removal Time: 0924; Extubation By: KATHYA (RN)                 Cayla Gaviria MD

## 2020-01-29 NOTE — TELEPHONE ENCOUNTER
Patient calling again for directions to [facility].  Again reviewed directions and arrival time at 7am  Daughter is transporting patient.    Voices understanding.

## 2020-01-30 ENCOUNTER — HOSPITAL ENCOUNTER (OUTPATIENT)
Facility: CLINIC | Age: 85
Discharge: HOME | End: 2020-01-30
Attending: ANESTHESIOLOGY | Admitting: ANESTHESIOLOGY
Payer: COMMERCIAL

## 2020-01-30 ENCOUNTER — ANESTHESIA (OUTPATIENT)
Dept: SURGERY | Facility: CLINIC | Age: 85
End: 2020-01-30
Payer: COMMERCIAL

## 2020-01-30 ENCOUNTER — APPOINTMENT (OUTPATIENT)
Dept: INTERVENTIONAL RADIOLOGY/VASCULAR | Facility: CLINIC | Age: 85
End: 2020-01-30
Attending: NEUROLOGICAL SURGERY
Payer: COMMERCIAL

## 2020-01-30 VITALS
OXYGEN SATURATION: 98 % | WEIGHT: 117.28 LBS | HEART RATE: 72 BPM | TEMPERATURE: 98.5 F | BODY MASS INDEX: 23.64 KG/M2 | RESPIRATION RATE: 14 BRPM | HEIGHT: 59 IN | DIASTOLIC BLOOD PRESSURE: 73 MMHG | SYSTOLIC BLOOD PRESSURE: 151 MMHG

## 2020-01-30 DIAGNOSIS — G50.0 TRIGEMINAL NEURALGIA: ICD-10-CM

## 2020-01-30 LAB
CREAT SERPL-MCNC: 0.8 MG/DL (ref 0.52–1.04)
GFR SERPL CREATININE-BSD FRML MDRD: 67 ML/MIN/{1.73_M2}
GLUCOSE BLDC GLUCOMTR-MCNC: 92 MG/DL (ref 70–99)
POTASSIUM SERPL-SCNC: 3.7 MMOL/L (ref 3.4–5.3)

## 2020-01-30 PROCEDURE — 37000008 ZZH ANESTHESIA TECHNICAL FEE, 1ST 30 MIN

## 2020-01-30 PROCEDURE — 77002 NEEDLE LOCALIZATION BY XRAY: CPT

## 2020-01-30 PROCEDURE — 27210759 ZZH DEVICE INFLATION CR6

## 2020-01-30 PROCEDURE — 84132 ASSAY OF SERUM POTASSIUM: CPT | Performed by: ANESTHESIOLOGY

## 2020-01-30 PROCEDURE — 71000012 ZZH RECOVERY PHASE 1 LEVEL 1 FIRST HR

## 2020-01-30 PROCEDURE — 40000170 ZZH STATISTIC PRE-PROCEDURE ASSESSMENT II

## 2020-01-30 PROCEDURE — 27211039 ZZH NEEDLE CR2

## 2020-01-30 PROCEDURE — 25000128 H RX IP 250 OP 636: Performed by: NURSE ANESTHETIST, CERTIFIED REGISTERED

## 2020-01-30 PROCEDURE — 27210795 ZZH PAD DEFIB QUICK CR4

## 2020-01-30 PROCEDURE — 25000128 H RX IP 250 OP 636: Performed by: STUDENT IN AN ORGANIZED HEALTH CARE EDUCATION/TRAINING PROGRAM

## 2020-01-30 PROCEDURE — 82962 GLUCOSE BLOOD TEST: CPT

## 2020-01-30 PROCEDURE — 37000009 ZZH ANESTHESIA TECHNICAL FEE, EACH ADDTL 15 MIN

## 2020-01-30 PROCEDURE — 27210891 ZZH BALLOON (NON-PTA) CR6

## 2020-01-30 PROCEDURE — 25000565 ZZH ISOFLURANE, EA 15 MIN

## 2020-01-30 PROCEDURE — 82565 ASSAY OF CREATININE: CPT | Performed by: ANESTHESIOLOGY

## 2020-01-30 PROCEDURE — 71000027 ZZH RECOVERY PHASE 2 EACH 15 MINS

## 2020-01-30 PROCEDURE — 25800030 ZZH RX IP 258 OP 636: Performed by: ANESTHESIOLOGY

## 2020-01-30 PROCEDURE — 36415 COLL VENOUS BLD VENIPUNCTURE: CPT | Performed by: ANESTHESIOLOGY

## 2020-01-30 PROCEDURE — 25000125 ZZHC RX 250: Performed by: NURSE ANESTHETIST, CERTIFIED REGISTERED

## 2020-01-30 RX ORDER — MEPERIDINE HYDROCHLORIDE 50 MG/ML
12.5 INJECTION INTRAMUSCULAR; INTRAVENOUS; SUBCUTANEOUS
Status: DISCONTINUED | OUTPATIENT
Start: 2020-01-30 | End: 2020-01-30 | Stop reason: HOSPADM

## 2020-01-30 RX ORDER — SODIUM CHLORIDE, SODIUM LACTATE, POTASSIUM CHLORIDE, CALCIUM CHLORIDE 600; 310; 30; 20 MG/100ML; MG/100ML; MG/100ML; MG/100ML
INJECTION, SOLUTION INTRAVENOUS CONTINUOUS
Status: DISCONTINUED | OUTPATIENT
Start: 2020-01-30 | End: 2020-01-30 | Stop reason: HOSPADM

## 2020-01-30 RX ORDER — CEFAZOLIN SODIUM 1 G/3ML
1 INJECTION, POWDER, FOR SOLUTION INTRAMUSCULAR; INTRAVENOUS SEE ADMIN INSTRUCTIONS
Status: DISCONTINUED | OUTPATIENT
Start: 2020-01-30 | End: 2020-01-30 | Stop reason: HOSPADM

## 2020-01-30 RX ORDER — HYDROMORPHONE HYDROCHLORIDE 1 MG/ML
.3-.5 INJECTION, SOLUTION INTRAMUSCULAR; INTRAVENOUS; SUBCUTANEOUS EVERY 10 MIN PRN
Status: DISCONTINUED | OUTPATIENT
Start: 2020-01-30 | End: 2020-01-30 | Stop reason: HOSPADM

## 2020-01-30 RX ORDER — ALBUTEROL SULFATE 0.83 MG/ML
2.5 SOLUTION RESPIRATORY (INHALATION) EVERY 4 HOURS PRN
Status: DISCONTINUED | OUTPATIENT
Start: 2020-01-30 | End: 2020-01-30 | Stop reason: HOSPADM

## 2020-01-30 RX ORDER — LIDOCAINE 40 MG/G
CREAM TOPICAL
Status: DISCONTINUED | OUTPATIENT
Start: 2020-01-30 | End: 2020-01-30 | Stop reason: HOSPADM

## 2020-01-30 RX ORDER — HYDRALAZINE HYDROCHLORIDE 20 MG/ML
2.5-5 INJECTION INTRAMUSCULAR; INTRAVENOUS EVERY 10 MIN PRN
Status: DISCONTINUED | OUTPATIENT
Start: 2020-01-30 | End: 2020-01-30 | Stop reason: HOSPADM

## 2020-01-30 RX ORDER — ONDANSETRON 2 MG/ML
4 INJECTION INTRAMUSCULAR; INTRAVENOUS EVERY 30 MIN PRN
Status: DISCONTINUED | OUTPATIENT
Start: 2020-01-30 | End: 2020-01-30 | Stop reason: HOSPADM

## 2020-01-30 RX ORDER — LIDOCAINE HYDROCHLORIDE 20 MG/ML
INJECTION, SOLUTION INFILTRATION; PERINEURAL PRN
Status: DISCONTINUED | OUTPATIENT
Start: 2020-01-30 | End: 2020-01-30

## 2020-01-30 RX ORDER — LIDOCAINE HYDROCHLORIDE 10 MG/ML
1-30 INJECTION, SOLUTION EPIDURAL; INFILTRATION; INTRACAUDAL; PERINEURAL
Status: DISCONTINUED | OUTPATIENT
Start: 2020-01-30 | End: 2020-01-30 | Stop reason: CLARIF

## 2020-01-30 RX ORDER — FENTANYL CITRATE 50 UG/ML
INJECTION, SOLUTION INTRAMUSCULAR; INTRAVENOUS PRN
Status: DISCONTINUED | OUTPATIENT
Start: 2020-01-30 | End: 2020-01-30

## 2020-01-30 RX ORDER — NALOXONE HYDROCHLORIDE 0.4 MG/ML
.1-.4 INJECTION, SOLUTION INTRAMUSCULAR; INTRAVENOUS; SUBCUTANEOUS
Status: DISCONTINUED | OUTPATIENT
Start: 2020-01-30 | End: 2020-01-30 | Stop reason: HOSPADM

## 2020-01-30 RX ORDER — FENTANYL CITRATE 50 UG/ML
25-50 INJECTION, SOLUTION INTRAMUSCULAR; INTRAVENOUS
Status: DISCONTINUED | OUTPATIENT
Start: 2020-01-30 | End: 2020-01-30 | Stop reason: HOSPADM

## 2020-01-30 RX ORDER — ONDANSETRON 2 MG/ML
INJECTION INTRAMUSCULAR; INTRAVENOUS PRN
Status: DISCONTINUED | OUTPATIENT
Start: 2020-01-30 | End: 2020-01-30

## 2020-01-30 RX ORDER — CEFAZOLIN SODIUM 2 G/100ML
2 INJECTION, SOLUTION INTRAVENOUS
Status: DISCONTINUED | OUTPATIENT
Start: 2020-01-30 | End: 2020-01-30 | Stop reason: HOSPADM

## 2020-01-30 RX ORDER — PROPOFOL 10 MG/ML
INJECTION, EMULSION INTRAVENOUS PRN
Status: DISCONTINUED | OUTPATIENT
Start: 2020-01-30 | End: 2020-01-30

## 2020-01-30 RX ORDER — DIMENHYDRINATE 50 MG/ML
25 INJECTION, SOLUTION INTRAMUSCULAR; INTRAVENOUS
Status: DISCONTINUED | OUTPATIENT
Start: 2020-01-30 | End: 2020-01-30 | Stop reason: HOSPADM

## 2020-01-30 RX ORDER — ONDANSETRON 4 MG/1
4 TABLET, ORALLY DISINTEGRATING ORAL EVERY 30 MIN PRN
Status: DISCONTINUED | OUTPATIENT
Start: 2020-01-30 | End: 2020-01-30 | Stop reason: HOSPADM

## 2020-01-30 RX ORDER — ESMOLOL HYDROCHLORIDE 10 MG/ML
INJECTION INTRAVENOUS PRN
Status: DISCONTINUED | OUTPATIENT
Start: 2020-01-30 | End: 2020-01-30

## 2020-01-30 RX ORDER — METOPROLOL TARTRATE 1 MG/ML
1-2 INJECTION, SOLUTION INTRAVENOUS EVERY 5 MIN PRN
Status: DISCONTINUED | OUTPATIENT
Start: 2020-01-30 | End: 2020-01-30 | Stop reason: HOSPADM

## 2020-01-30 RX ADMIN — FENTANYL CITRATE 50 MCG: 50 INJECTION, SOLUTION INTRAMUSCULAR; INTRAVENOUS at 09:20

## 2020-01-30 RX ADMIN — ESMOLOL HYDROCHLORIDE 20 MG: 10 INJECTION, SOLUTION INTRAVENOUS at 09:50

## 2020-01-30 RX ADMIN — SODIUM CHLORIDE, POTASSIUM CHLORIDE, SODIUM LACTATE AND CALCIUM CHLORIDE: 600; 310; 30; 20 INJECTION, SOLUTION INTRAVENOUS at 09:10

## 2020-01-30 RX ADMIN — PROPOFOL 30 MG: 10 INJECTION, EMULSION INTRAVENOUS at 09:47

## 2020-01-30 RX ADMIN — FENTANYL CITRATE 50 MCG: 50 INJECTION, SOLUTION INTRAMUSCULAR; INTRAVENOUS at 09:48

## 2020-01-30 RX ADMIN — CEFAZOLIN 1 G: 1 INJECTION, POWDER, FOR SOLUTION INTRAMUSCULAR; INTRAVENOUS at 09:35

## 2020-01-30 RX ADMIN — SUGAMMADEX 200 MG: 100 INJECTION, SOLUTION INTRAVENOUS at 09:56

## 2020-01-30 RX ADMIN — LIDOCAINE HYDROCHLORIDE 100 MG: 20 INJECTION, SOLUTION INFILTRATION; PERINEURAL at 09:20

## 2020-01-30 RX ADMIN — ESMOLOL HYDROCHLORIDE 20 MG: 10 INJECTION, SOLUTION INTRAVENOUS at 09:47

## 2020-01-30 RX ADMIN — ROCURONIUM BROMIDE 50 MG: 10 INJECTION INTRAVENOUS at 09:21

## 2020-01-30 RX ADMIN — ONDANSETRON 4 MG: 2 INJECTION INTRAMUSCULAR; INTRAVENOUS at 09:54

## 2020-01-30 RX ADMIN — PROPOFOL 120 MG: 10 INJECTION, EMULSION INTRAVENOUS at 09:20

## 2020-01-30 ASSESSMENT — MIFFLIN-ST. JEOR: SCORE: 882.63

## 2020-01-30 NOTE — ANESTHESIA CARE TRANSFER NOTE
Patient: Tonia Meeks    Procedure(s):  ANESTHESIA OUT OF OR rhizotomy    Diagnosis: Trigeminal neuralgia [G50.0]  Diagnosis Additional Information: No value filed.    Anesthesia Type:   General     Note:  Airway :Face Mask  Patient transferred to:PACU  Comments: Pt alert, breathing spontaneously on 6L O2 via FM. VSS. Report shared with RN.Handoff Report: Identifed the Patient, Identified the Reponsible Provider, Reviewed the pertinent medical history, Discussed the surgical course, Reviewed Intra-OP anesthesia mangement and issues during anesthesia, Set expectations for post-procedure period and Allowed opportunity for questions and acknowledgement of understanding      Vitals: (Last set prior to Anesthesia Care Transfer)    CRNA VITALS  1/30/2020 0940 - 1/30/2020 1017      1/30/2020             NIBP:  160/80    NIBP Mean:  105    Ht Rate:  82    SpO2:  100 %                Electronically Signed By: KENYA Staley CRNA  January 30, 2020  10:17 AM

## 2020-01-30 NOTE — DISCHARGE INSTRUCTIONS
[facility]  Same-Day Surgery   Adult Discharge Orders & Instructions     For 24 hours after surgery    1. Get plenty of rest.  A responsible adult must stay with you for at least 24 hours after you leave the hospital.   2. Do not drive or use heavy equipment.  If you have weakness or tingling, don't drive or use heavy equipment until this feeling goes away.  3. Do not drink alcohol.  4. Avoid strenuous or risky activities.  Ask for help when climbing stairs.   5. You may feel lightheaded.  IF so, sit for a few minutes before standing.  Have someone help you get up.   6. If you have nausea (feel sick to your stomach): Drink only clear liquids such as apple juice, ginger ale, broth or 7-Up.  Rest may also help.  Be sure to drink enough fluids.  Move to a regular diet as you feel able.  7. You may have a slight fever. Call the doctor if your fever is over 100 F (37.7 C) (taken under the tongue) or lasts longer than 24 hours.  8. You may have a dry mouth, a sore throat, muscle aches or trouble sleeping.  These should go away after 24 hours.  9. Do not make important or legal decisions.   Call your doctor for any of the followin.  Signs of infection (fever, growing tenderness at the surgery site, a large amount of drainage or bleeding, severe pain, foul-smelling drainage, redness, swelling).    2. It has been over 8 to 10 hours since surgery and you are still not able to urinate (pass water).    3.  Headache for over 24 hours.      To contact a doctor during clinic hours, call   Dr. Conklin's neurosurgery office at [phone]  or:       After hours  [phone] and ask for the resident on call for   Neurosurgery team (answered 24 hours a day)      Emergency Department:    [facility]: [phone]       (TTY for hearing impaired: [phone])    [facility]: [phone]       (TTY for hearing impaired: [phone])

## 2020-01-30 NOTE — OP NOTE
Procedure Date: 01/30/2020      SURGEON:  Sara Godoy MD      :  Marko Leyva MD      PREOPERATIVE DIAGNOSIS:  Right-sided trigeminal neuralgia.      POSTOPERATIVE DIAGNOSIS:  Right-sided trigeminal neuralgia.      PROCEDURES PERFORMED:  Right-sided balloon rhizotomy.      ANESTHESIA:  General.      ESTIMATED BLOOD LOSS:  Less than 3 mL.      COMPLICATIONS:  None immediately apparent.      PREOPERATIVE HISTORY:  Tonia is an 85-year-old woman who has a history of trigeminal neuralgia.  She underwent a balloon rhizotomy 3 years ago by my partner, Dr. Nahid Salas.  This had good relief up until recently.  She has had recurrence of her pain and recently came back and saw me in clinic requesting a repeat procedure for her trigeminal neuralgia.  She understood the risks and benefits and wished to proceed.      DESCRIPTION OF PROCEDURE:  After signing an informed written consent, we brought her to the angiography suite where she was positioned on the angiography table in the supine fashion.  We marked out Hartel's landmarks on the right side of the face, which was then prepped and draped in the usual sterile fashion.  I then used a 14-gauge needle to nick the skin and then I advanced a trocar up through the cheek towards the foramen ovale.  On AP and lateral imaging, I could see the foramen which was large in size.  I advanced my trocar through the origin of the foramen ovale.  I then removed the inner stylet and then I passed a longer stylet into the Meckel's cave.  I removed this and then advanced a 4-Gibraltarian Conchis balloon and then inflated the 4-Gibraltarian Conchis balloon to 1.6 atmospheres of pressure for a minute and a half.  At that point, we then released the pressure of the balloon and removed the balloon and the trocar together in 1 piece.  I then held pressure on the cheek for 5 minutes.      I attest I was present for the entire duration of the procedure.         SARA GODOY MD              D: 2020   T: 2020   MT: MARSHAL      Name:     ANKIT DUMONT   MRN:      [id number]        Account:        [id number]   :      1934           Procedure Date: 2020      Document: [id number]

## 2020-01-30 NOTE — PROGRESS NOTES
Patient Name: Tonia Meeks  Medical Record Number: [id number]  Today's Date: 1/30/2020    Procedure: Rhizotomy  Proceduralist: Dr. Conklin    Sedation: Per anesthesia    Procedure start time: 0940  Puncture time: 0940  Procedure end time: 0950    Report per anesthesia    Other Notes: Pt arrived to IR room 3 from . Consent reviewed. Pt denies any questions or concerns regarding procedure. Pt positioned supine and monitored per protocol. Pt tolerated procedure without any noted complications. Pt transferred back to PACU.

## 2020-01-30 NOTE — ANESTHESIA POSTPROCEDURE EVALUATION
Anesthesia POST Procedure Evaluation    Patient: Tonia Meeks   MRN:     [id number] Gender:   female   Age:    85 year old :      1934        Preoperative Diagnosis: Trigeminal neuralgia [G50.0]   Procedure(s):  ANESTHESIA OUT OF OR rhizotomy   Postop Comments: No value filed.       Anesthesia Type:  Not documented  General    Reportable Event: NO     PAIN: Uncomplicated   Sign Out status: Comfortable, Well controlled pain     PONV: No PONV   Sign Out status:  No Nausea or Vomiting     Neuro/Psych: Uneventful perioperative course   Sign Out Status: Preoperative baseline; Age appropriate mentation     Airway/Resp.: Uneventful perioperative course   Sign Out Status: Non labored breathing, age appropriate RR; Resp. Status within EXPECTED Parameters     CV: Uneventful perioperative course   Sign Out status: Appropriate BP and perfusion indices; Appropriate HR/Rhythm     Disposition:   Sign Out in:  PACU  Disposition:  Phase II; Home  Recovery Course: Uneventful  Follow-Up: Not required           Last Anesthesia Record Vitals:  CRNA VITALS  2020 0940 - 2020 1040      2020             NIBP:  160/80    NIBP Mean:  105    Ht Rate:  82    SpO2:  100 %          Last PACU Vitals:  Vitals Value Taken Time   /69 2020 11:00 AM   Temp 36.7  C (98  F) 2020 11:00 AM   Pulse 72 2020 11:00 AM   Resp 16 2020 11:00 AM   SpO2 95 % 2020 11:09 AM   Temp src     NIBP 160/80 2020 10:15 AM   Pulse     SpO2 100 % 2020 10:15 AM   Resp     Temp     Ht Rate 82 2020 10:15 AM   Temp 2     Vitals shown include unvalidated device data.      Electronically Signed By: Cayla Gaviria MD, 2020, 11:10 AM

## 2020-01-30 NOTE — BRIEF OP NOTE
[facility], [place]    Brief Operative Note    Pre-operative diagnosis: Trigeminal neuralgia [G50.0]  Post-operative diagnosis Same as pre-operative diagnosis    Procedure: Procedure(s):  Right balloon rhizotomy  Surgeon: Surgeon(s) and Role:     * Cipriano Conklin MD - Primary surgeon     * Marko Leyva MD -   Anesthesia: General   Estimated blood loss: Minimal  Drains: None  Specimens: * No specimens in log *  Findings:   procedure as stated, no complications, balloon inflated to 1.6 cathleen for 2 minutes.  Complications: None.  Implants: * No implants in log *      Marko Leyva MD  Neurosurgery Resident PGY2    Please contact neurosurgery resident on call with questions.    Dial * * [phone], enter [phone] when prompted.

## 2020-02-03 ENCOUNTER — NURSE TRIAGE (OUTPATIENT)
Dept: NURSING | Facility: CLINIC | Age: 85
End: 2020-02-03

## 2020-02-03 NOTE — TELEPHONE ENCOUNTER
On 1/30/2020 Pt had surgery for Right sided Trigeminal neuralgia.    Since the surgery,  Pt is not feeling any pain relief at all.     Increased pain.  Pain medication is not helping.   Pt very upset the Pt is not better.   It seems to be worse.   Pain medication is not helping with the pain.  No other Symptoms noted.        No fever, Dizziness or drainage noted.       Pt would like a call back at [phone] for further assistance.           Thank you     ,Samantha Ashton RN  Central Triage Red Flags/Med Refills          Reason for Disposition    [1] SEVERE post-op pain (e.g., excruciating, pain scale 8-10) AND [2] not controlled with pain medications    Additional Information    Negative: Sounds like a life-threatening emergency to the triager    Negative: Chest pain    Negative: Difficulty breathing    Negative: Surgical incision symptoms and questions    Negative: [1] Discomfort (pain, burning or stinging) when passing urine AND [2] male    Negative: [1] Discomfort (pain, burning or stinging) when passing urine AND [2] female    Negative: Constipation    Negative: New or worsening leg (calf, thigh) pain    Negative: New or worsening leg swelling    Negative: Dizziness is severe, or persists > 24 hours after surgery    Negative: Pain, redness, swelling, or pus at IV Site    Negative: Symptoms arising from use of a urinary catheter (Driver or Coude)    Negative: Cast problems or questions    Negative: Medication question    Negative: [1] Widespread rash AND [2] bright red, sunburn-like    Negative: [1] SEVERE headache AND [2] after spinal (epidural) anesthesia    Negative: [1] Vomiting AND [2] persists > 4 hours    Negative: [1] Vomiting AND [2] abdomen looks much more swollen than usual    Negative: [1] Drinking very little AND [2] dehydration suspected (e.g., no urine > 12 hours, very dry mouth, very lightheaded)    Negative: Patient sounds very sick or weak to the triager    Negative: Sounds like a serious  complication to the triager    Negative: Fever > 100.5 F (38.1 C)    Protocols used: POST-OP SYMPTOMS AND [id number]

## 2020-02-03 NOTE — TELEPHONE ENCOUNTER
Callback to patient, states right sided lower jaw discomfort remains when eating, brushing teeth, blowing nose. Remains on Carbamazepine 50mg three times daily.    Recommend continue medication.  DOS 1/30/20 right sided balloon rhizotomy Rubin    Pt will continue medication, let the procedure try to work some and check back in 2 days.    Voices understanding, at times may take some time for procedure to really start working.

## 2020-02-05 ENCOUNTER — TELEPHONE (OUTPATIENT)
Dept: NEUROSURGERY | Facility: CLINIC | Age: 85
End: 2020-02-05

## 2020-02-05 DIAGNOSIS — G50.0 TRIGEMINAL NEURALGIA: Primary | ICD-10-CM

## 2020-02-05 RX ORDER — CARBAMAZEPINE 100 MG/1
TABLET, CHEWABLE ORAL
Qty: 45 TABLET | Refills: 0 | Status: SHIPPED | OUTPATIENT
Start: 2020-02-05 | End: 2021-12-30

## 2020-02-05 RX ORDER — ACYCLOVIR 50 MG/G
CREAM TOPICAL
Qty: 5 G | Refills: 3 | Status: SHIPPED | OUTPATIENT
Start: 2020-02-05 | End: 2024-08-22

## 2020-02-05 NOTE — TELEPHONE ENCOUNTER
Spoke with Tonia, states no facial pain through night, sleeps through good.  Facial discomfort upon awakening in the day.  States using Carbamazepine 50mg (1/2 of a 100mg tablet) three times daily.  States cold sores started last Saturday, has been using OTC cream.  About 5 sores not oozing at this time, states drying up but very sore.  Pt has appointment with her Cardiologist this afternoon.  Per Dr Conklin can increase Carbamazepine to 100mg / 50 mg/ 50 mg to see if that will help facial pain.  Pt states only sore when touching face.    Acyclovir cream Ordered for patient per Dr Conklin,  Scheduled patient for 2/12 @ 320 pm.    Patient voices understanding and will callback if needed prior to appointment .

## 2020-02-05 NOTE — TELEPHONE ENCOUNTER
Spoke to Tonia, states has 5-6 very sore cold sores on top lip and bottom of nasal opening.    States right sided facial pain remains when right side of  face is touched.      DOS: 1/30/20 Right Sided Balloon Rhizotomy  Pt continues to use Carbamazephine 50mg bid.    Will refer to Dr Conklin and get back to patient.

## 2020-02-12 ENCOUNTER — TELEPHONE (OUTPATIENT)
Dept: NEUROSURGERY | Facility: CLINIC | Age: 85
End: 2020-02-12

## 2020-02-25 ENCOUNTER — OFFICE VISIT (OUTPATIENT)
Dept: NEUROSURGERY | Facility: CLINIC | Age: 85
End: 2020-02-25
Payer: COMMERCIAL

## 2020-02-25 VITALS
HEIGHT: 58 IN | DIASTOLIC BLOOD PRESSURE: 70 MMHG | BODY MASS INDEX: 24.9 KG/M2 | WEIGHT: 118.6 LBS | OXYGEN SATURATION: 97 % | HEART RATE: 58 BPM | SYSTOLIC BLOOD PRESSURE: 169 MMHG

## 2020-02-25 DIAGNOSIS — G50.0 TRIGEMINAL NEURALGIA: Primary | ICD-10-CM

## 2020-02-25 ASSESSMENT — PAIN SCALES - GENERAL: PAINLEVEL: NO PAIN (0)

## 2020-02-25 ASSESSMENT — MIFFLIN-ST. JEOR: SCORE: 874.7

## 2020-02-25 NOTE — PROGRESS NOTES
Date of service: 2/25/2020    Tonia Meeks is a 85 year old female with a history of right-sided trigeminal neuralgia status post balloon rhizotomy in 2015 with Dr. Salas.  She had good relief with the rhizotomy for almost 4 years.  She started having a return of her trigeminal neuralgia now s/p 1/30/2020 R balloon rhizotomy. After the procedure, she developed the cold sores on the R side and treated with acyclovir cream. She reports good symptom relief after the procedure.     Physical exam:   BP (!) 169/70  Pulse 58   SpO2 97%      General: Awake and alert and in no acute distress.  Pulm: Breathing comfortably on room air  CN: Symmetric browlift, smile, tongue protrusion, palate elevation, and trapezius. No dysarthria. Extraocular muscles are all intact.  Motor: Good muscle bulk throughout.  Sensation: Sensation grossly intact to light touch in all extremities.  Gait: Intact tandem gait.     Assessment:  85 year old female with a right-sided trigeminal neuralgia that has responded well to a balloon rhizotomy on 1/30/2020.     Plan:  -Wean tegretol   -Follow up as needed     Patient seen and discussed with MD Shruthi Aleman MD  Neurosurgery PGY2

## 2020-02-25 NOTE — NURSING NOTE
Chief Complaint   Patient presents with     RECHECK     [facility] RETURN - 2 WEEK POST OP DOS 01/30/2020       Rhys García, EMT

## 2020-02-25 NOTE — PATIENT INSTRUCTIONS
Taper Tegretol 100mg medication    Starting today Tuesday Feb 25th take Tegretol 1/2 tablet in the morning and evening until Thursday March 5th    Call Yanet  [phone] with an update on March 5th.     Starting March 6th take 1/2 tablet in the morning only.    Call Yanet CASTELLANO March 12th with update, then will stop Tegretol if able.    Reviewed with patient, daughter and .    Thank you for using [employer]

## 2020-02-25 NOTE — LETTER
2/25/2020       RE: Tonia Meeks  [address]  [facility] [address]     Dear Colleague,    Thank you for referring your patient, Tonia Meeks, to the [facility] NEUROSURGERY at [facility]. Please see a copy of my visit note below.    Date of service: 2/25/2020    Tonia Meeks is a 85 year old female with a history of right-sided trigeminal neuralgia status post balloon rhizotomy in 2015 with Dr. Salas.  She had good relief with the rhizotomy for almost 4 years.  She started having a return of her trigeminal neuralgia now s/p 1/30/2020 R balloon rhizotomy. After the procedure, she developed the cold sores on the R side and treated with acyclovir cream. She reports good symptom relief after the procedure.     Physical exam:   BP (!) 169/70  Pulse 58   SpO2 97%      General: Awake and alert and in no acute distress.  Pulm: Breathing comfortably on room air  CN: Symmetric browlift, smile, tongue protrusion, palate elevation, and trapezius. No dysarthria. Extraocular muscles are all intact.  Motor: Good muscle bulk throughout.  Sensation: Sensation grossly intact to light touch in all extremities.  Gait: Intact tandem gait.     Assessment:  85 year old female with a right-sided trigeminal neuralgia that has responded well to a balloon rhizotomy on 1/30/2020.     Plan:  -Wean tegretol   -Follow up as needed     Patient seen and discussed with MD Shruthi Aleman MD  Neurosurgery PGY2    Again, thank you for allowing me to participate in the care of your patient.      Sincerely,    Cipriano Conklin MD

## 2021-09-10 NOTE — PATIENT INSTRUCTIONS
1. Please complete your lab work today on the first floor.     2. Please start your Tegretol and call [facility] care coordinator in 4 days. [phone].     3. Please follow-up in clinic in 4 weeks.   
IV intact

## 2021-12-29 ENCOUNTER — TELEPHONE (OUTPATIENT)
Dept: NEUROSURGERY | Facility: CLINIC | Age: 86
End: 2021-12-29
Payer: COMMERCIAL

## 2021-12-29 DIAGNOSIS — G50.0 TRIGEMINAL NEURALGIA: ICD-10-CM

## 2021-12-30 RX ORDER — CARBAMAZEPINE 100 MG/1
TABLET, CHEWABLE ORAL
Qty: 45 TABLET | Refills: 2 | Status: SHIPPED | OUTPATIENT
Start: 2021-12-30

## 2021-12-30 NOTE — TELEPHONE ENCOUNTER
Spoke with , states Tonia having Trigeminal Neuralgia pain on the right side of face where previous pain had been.  DOS: 1/30/20 Right Sided Balloon Rhizotomy  Patient was able to taper off Carbamzepine after last procedure.    Shocking right sided facial pains started again last week.      Per Dr Conklin can restart Carbamzepine 50mg three times daily like previously done to help facial pain.   will  medication today and will callback to make appointment with Dr Conklin in next several weeks.    Voices understanding.

## 2021-12-30 NOTE — TELEPHONE ENCOUNTER
Call back to patient, patient  had left message Tonia having facial pain again.  H/O Right sided Balloon Rhizotomy and H/O Tegretol 50/50/50.      Call back Yanet RN for questions/concerns .

## 2021-12-31 ENCOUNTER — TELEPHONE (OUTPATIENT)
Dept: NEUROSURGERY | Facility: CLINIC | Age: 86
End: 2021-12-31
Payer: COMMERCIAL

## 2021-12-31 NOTE — TELEPHONE ENCOUNTER
Patients daughter calling Farrah asking about patient right sided facial pain strikes, medication and appointment.    Patient has the medication at this time Carbamazepine 100mg to take 1/2 tablet 3 times daily.  Patient is taking 100mg in the morning, 50mg mid day and 50mg at night.  States did sleep through the night last night, having facial pain strikes today.  Recommend continue medication, can use some Tylenol in between to see if that will help.  Appointment set up with Dr Conklin 1/10/22 @ 320p.    I will check in with family again on Monday.    Voices understanding.

## 2021-12-31 NOTE — TELEPHONE ENCOUNTER
calling asking about medication for Juan.   Explained we talked yesterday and it was called into pharmacy to take Carbazepine 50mg Three times daily for facial pain.  [facility] pharmacy will deliver medication.    Voices understanding.

## 2022-01-03 ENCOUNTER — TELEPHONE (OUTPATIENT)
Dept: NEUROSURGERY | Facility: CLINIC | Age: 87
End: 2022-01-03
Payer: COMMERCIAL

## 2022-01-03 ENCOUNTER — HOSPITAL ENCOUNTER (INPATIENT)
Facility: CLINIC | Age: 87
LOS: 2 days | Discharge: HOME | DRG: 074 | End: 2022-01-05
Attending: EMERGENCY MEDICINE | Admitting: NEUROLOGICAL SURGERY
Payer: COMMERCIAL

## 2022-01-03 DIAGNOSIS — Z20.822 LAB TEST NEGATIVE FOR COVID-19 VIRUS: ICD-10-CM

## 2022-01-03 DIAGNOSIS — G50.0 TRIGEMINAL NEURALGIA: ICD-10-CM

## 2022-01-03 LAB
ANION GAP SERPL CALCULATED.3IONS-SCNC: 7 MMOL/L (ref 3–14)
BASOPHILS # BLD AUTO: 0 10E3/UL (ref 0–0.2)
BASOPHILS NFR BLD AUTO: 0 %
BUN SERPL-MCNC: 17 MG/DL (ref 7–30)
CALCIUM SERPL-MCNC: 8.8 MG/DL (ref 8.5–10.1)
CHLORIDE BLD-SCNC: 105 MMOL/L (ref 94–109)
CO2 SERPL-SCNC: 23 MMOL/L (ref 20–32)
CREAT SERPL-MCNC: 0.69 MG/DL (ref 0.52–1.04)
EOSINOPHIL # BLD AUTO: 0 10E3/UL (ref 0–0.7)
EOSINOPHIL NFR BLD AUTO: 0 %
ERYTHROCYTE [DISTWIDTH] IN BLOOD BY AUTOMATED COUNT: 12.4 % (ref 10–15)
GFR SERPL CREATININE-BSD FRML MDRD: 84 ML/MIN/1.73M2
GLUCOSE BLD-MCNC: 115 MG/DL (ref 70–99)
HCT VFR BLD AUTO: 39.7 % (ref 35–47)
HGB BLD-MCNC: 13.3 G/DL (ref 11.7–15.7)
HOLD SPECIMEN: NORMAL
IMM GRANULOCYTES # BLD: 0 10E3/UL
IMM GRANULOCYTES NFR BLD: 0 %
LYMPHOCYTES # BLD AUTO: 1 10E3/UL (ref 0.8–5.3)
LYMPHOCYTES NFR BLD AUTO: 13 %
MCH RBC QN AUTO: 29.1 PG (ref 26.5–33)
MCHC RBC AUTO-ENTMCNC: 33.5 G/DL (ref 31.5–36.5)
MCV RBC AUTO: 87 FL (ref 78–100)
MONOCYTES # BLD AUTO: 0.3 10E3/UL (ref 0–1.3)
MONOCYTES NFR BLD AUTO: 4 %
NEUTROPHILS # BLD AUTO: 6.3 10E3/UL (ref 1.6–8.3)
NEUTROPHILS NFR BLD AUTO: 83 %
NRBC # BLD AUTO: 0 10E3/UL
NRBC BLD AUTO-RTO: 0 /100
PLATELET # BLD AUTO: 163 10E3/UL (ref 150–450)
POTASSIUM BLD-SCNC: 4.4 MMOL/L (ref 3.4–5.3)
RBC # BLD AUTO: 4.57 10E6/UL (ref 3.8–5.2)
SARS-COV-2 RNA RESP QL NAA+PROBE: NEGATIVE
SODIUM SERPL-SCNC: 135 MMOL/L (ref 133–144)
WBC # BLD AUTO: 7.7 10E3/UL (ref 4–11)

## 2022-01-03 PROCEDURE — 36415 COLL VENOUS BLD VENIPUNCTURE: CPT | Performed by: EMERGENCY MEDICINE

## 2022-01-03 PROCEDURE — 99285 EMERGENCY DEPT VISIT HI MDM: CPT | Mod: 25 | Performed by: EMERGENCY MEDICINE

## 2022-01-03 PROCEDURE — 258N000003 HC RX IP 258 OP 636: Performed by: EMERGENCY MEDICINE

## 2022-01-03 PROCEDURE — 250N000011 HC RX IP 250 OP 636: Performed by: NURSE PRACTITIONER

## 2022-01-03 PROCEDURE — 96375 TX/PRO/DX INJ NEW DRUG ADDON: CPT | Performed by: EMERGENCY MEDICINE

## 2022-01-03 PROCEDURE — 82310 ASSAY OF CALCIUM: CPT | Performed by: EMERGENCY MEDICINE

## 2022-01-03 PROCEDURE — 250N000011 HC RX IP 250 OP 636: Performed by: STUDENT IN AN ORGANIZED HEALTH CARE EDUCATION/TRAINING PROGRAM

## 2022-01-03 PROCEDURE — 999N000128 HC STATISTIC PERIPHERAL IV START W/O US GUIDANCE

## 2022-01-03 PROCEDURE — 250N000011 HC RX IP 250 OP 636: Performed by: EMERGENCY MEDICINE

## 2022-01-03 PROCEDURE — 120N000002 HC R&B MED SURG/OB UMMC

## 2022-01-03 PROCEDURE — 96361 HYDRATE IV INFUSION ADD-ON: CPT | Performed by: EMERGENCY MEDICINE

## 2022-01-03 PROCEDURE — 99223 1ST HOSP IP/OBS HIGH 75: CPT | Mod: 57 | Performed by: NURSE PRACTITIONER

## 2022-01-03 PROCEDURE — 250N000013 HC RX MED GY IP 250 OP 250 PS 637: Performed by: NURSE PRACTITIONER

## 2022-01-03 PROCEDURE — 85025 COMPLETE CBC W/AUTO DIFF WBC: CPT | Performed by: EMERGENCY MEDICINE

## 2022-01-03 PROCEDURE — 258N000003 HC RX IP 258 OP 636: Performed by: NURSE PRACTITIONER

## 2022-01-03 PROCEDURE — 250N000013 HC RX MED GY IP 250 OP 250 PS 637: Performed by: NEUROLOGICAL SURGERY

## 2022-01-03 PROCEDURE — 96374 THER/PROPH/DIAG INJ IV PUSH: CPT | Performed by: EMERGENCY MEDICINE

## 2022-01-03 PROCEDURE — C9803 HOPD COVID-19 SPEC COLLECT: HCPCS | Performed by: EMERGENCY MEDICINE

## 2022-01-03 PROCEDURE — U0003 INFECTIOUS AGENT DETECTION BY NUCLEIC ACID (DNA OR RNA); SEVERE ACUTE RESPIRATORY SYNDROME CORONAVIRUS 2 (SARS-COV-2) (CORONAVIRUS DISEASE [COVID-19]), AMPLIFIED PROBE TECHNIQUE, MAKING USE OF HIGH THROUGHPUT TECHNOLOGIES AS DESCRIBED BY CMS-2020-01-R: HCPCS | Performed by: NURSE PRACTITIONER

## 2022-01-03 PROCEDURE — 99284 EMERGENCY DEPT VISIT MOD MDM: CPT | Performed by: EMERGENCY MEDICINE

## 2022-01-03 RX ORDER — LABETALOL HYDROCHLORIDE 5 MG/ML
10 INJECTION, SOLUTION INTRAVENOUS EVERY 6 HOURS PRN
Status: DISCONTINUED | OUTPATIENT
Start: 2022-01-03 | End: 2022-01-05 | Stop reason: HOSPADM

## 2022-01-03 RX ORDER — ONDANSETRON 2 MG/ML
4 INJECTION INTRAMUSCULAR; INTRAVENOUS ONCE
Status: COMPLETED | OUTPATIENT
Start: 2022-01-03 | End: 2022-01-03

## 2022-01-03 RX ORDER — OXYCODONE HYDROCHLORIDE 5 MG/1
5 TABLET ORAL EVERY 4 HOURS PRN
Status: DISCONTINUED | OUTPATIENT
Start: 2022-01-03 | End: 2022-01-05 | Stop reason: HOSPADM

## 2022-01-03 RX ORDER — ATORVASTATIN CALCIUM 20 MG/1
20 TABLET, FILM COATED ORAL DAILY
Status: DISCONTINUED | OUTPATIENT
Start: 2022-01-04 | End: 2022-01-05 | Stop reason: HOSPADM

## 2022-01-03 RX ORDER — SODIUM CHLORIDE 9 MG/ML
INJECTION, SOLUTION INTRAVENOUS
Status: DISCONTINUED
Start: 2022-01-03 | End: 2022-01-03 | Stop reason: HOSPADM

## 2022-01-03 RX ORDER — ONDANSETRON 4 MG/1
4 TABLET, ORALLY DISINTEGRATING ORAL EVERY 6 HOURS PRN
Status: DISCONTINUED | OUTPATIENT
Start: 2022-01-03 | End: 2022-01-05 | Stop reason: HOSPADM

## 2022-01-03 RX ORDER — METOPROLOL TARTRATE 25 MG/1
25 TABLET, FILM COATED ORAL DAILY
Status: DISCONTINUED | OUTPATIENT
Start: 2022-01-03 | End: 2022-01-03

## 2022-01-03 RX ORDER — HYDRALAZINE HYDROCHLORIDE 20 MG/ML
5 INJECTION INTRAMUSCULAR; INTRAVENOUS EVERY 6 HOURS PRN
Status: DISCONTINUED | OUTPATIENT
Start: 2022-01-03 | End: 2022-01-05 | Stop reason: HOSPADM

## 2022-01-03 RX ORDER — SODIUM CHLORIDE 9 MG/ML
INJECTION, SOLUTION INTRAVENOUS CONTINUOUS
Status: DISCONTINUED | OUTPATIENT
Start: 2022-01-03 | End: 2022-01-04

## 2022-01-03 RX ORDER — CARBAMAZEPINE 100 MG/1
100 TABLET, CHEWABLE ORAL EVERY MORNING
Status: DISCONTINUED | OUTPATIENT
Start: 2022-01-04 | End: 2022-01-05 | Stop reason: HOSPADM

## 2022-01-03 RX ORDER — HYDROMORPHONE HCL IN WATER/PF 6 MG/30 ML
0.2 PATIENT CONTROLLED ANALGESIA SYRINGE INTRAVENOUS ONCE
Status: COMPLETED | OUTPATIENT
Start: 2022-01-03 | End: 2022-01-03

## 2022-01-03 RX ORDER — METOPROLOL SUCCINATE 25 MG/1
25 TABLET, EXTENDED RELEASE ORAL DAILY
Status: DISCONTINUED | OUTPATIENT
Start: 2022-01-03 | End: 2022-01-05 | Stop reason: HOSPADM

## 2022-01-03 RX ORDER — HYDROMORPHONE HCL IN WATER/PF 6 MG/30 ML
0.2 PATIENT CONTROLLED ANALGESIA SYRINGE INTRAVENOUS EVERY 4 HOURS PRN
Status: DISCONTINUED | OUTPATIENT
Start: 2022-01-03 | End: 2022-01-05 | Stop reason: HOSPADM

## 2022-01-03 RX ORDER — ONDANSETRON 2 MG/ML
4 INJECTION INTRAMUSCULAR; INTRAVENOUS EVERY 6 HOURS PRN
Status: DISCONTINUED | OUTPATIENT
Start: 2022-01-03 | End: 2022-01-05 | Stop reason: HOSPADM

## 2022-01-03 RX ORDER — LISINOPRIL 5 MG/1
5 TABLET ORAL DAILY
Status: DISCONTINUED | OUTPATIENT
Start: 2022-01-04 | End: 2022-01-05 | Stop reason: HOSPADM

## 2022-01-03 RX ADMIN — ONDANSETRON 4 MG: 2 INJECTION INTRAMUSCULAR; INTRAVENOUS at 13:08

## 2022-01-03 RX ADMIN — HYDROMORPHONE HYDROCHLORIDE 0.2 MG: 0.2 INJECTION, SOLUTION INTRAMUSCULAR; INTRAVENOUS; SUBCUTANEOUS at 21:56

## 2022-01-03 RX ADMIN — OXYCODONE HYDROCHLORIDE 5 MG: 5 TABLET ORAL at 18:37

## 2022-01-03 RX ADMIN — SODIUM CHLORIDE 802.5 MG PE: 9 INJECTION, SOLUTION INTRAVENOUS at 20:35

## 2022-01-03 RX ADMIN — HYDRALAZINE HYDROCHLORIDE 5 MG: 20 INJECTION INTRAMUSCULAR; INTRAVENOUS at 16:14

## 2022-01-03 RX ADMIN — Medication 50 MG: at 20:33

## 2022-01-03 RX ADMIN — ONDANSETRON 4 MG: 2 INJECTION INTRAMUSCULAR; INTRAVENOUS at 17:01

## 2022-01-03 RX ADMIN — HYDROMORPHONE HYDROCHLORIDE 0.2 MG: 0.2 INJECTION, SOLUTION INTRAMUSCULAR; INTRAVENOUS; SUBCUTANEOUS at 13:41

## 2022-01-03 RX ADMIN — METOPROLOL SUCCINATE 25 MG: 25 TABLET, FILM COATED, EXTENDED RELEASE ORAL at 20:34

## 2022-01-03 RX ADMIN — SODIUM CHLORIDE 500 ML: 9 INJECTION, SOLUTION INTRAVENOUS at 13:07

## 2022-01-03 RX ADMIN — HYDROMORPHONE HYDROCHLORIDE 0.2 MG: 0.2 INJECTION, SOLUTION INTRAMUSCULAR; INTRAVENOUS; SUBCUTANEOUS at 17:51

## 2022-01-03 RX ADMIN — SODIUM CHLORIDE: 9 INJECTION, SOLUTION INTRAVENOUS at 18:00

## 2022-01-03 ASSESSMENT — ACTIVITIES OF DAILY LIVING (ADL)
ADLS_ACUITY_SCORE: 8
ADLS_ACUITY_SCORE: 8
ADLS_ACUITY_SCORE: 6
ADLS_ACUITY_SCORE: 4
ADLS_ACUITY_SCORE: 6
ADLS_ACUITY_SCORE: 8
ADLS_ACUITY_SCORE: 4
ADLS_ACUITY_SCORE: 4
ADLS_ACUITY_SCORE: 6

## 2022-01-03 ASSESSMENT — ENCOUNTER SYMPTOMS
COUGH: 0
CHILLS: 0
RHINORRHEA: 0
VOMITING: 1
NAUSEA: 1
SORE THROAT: 0
ABDOMINAL DISTENTION: 0
EYES NEGATIVE: 1
FEVER: 0
ABDOMINAL PAIN: 0

## 2022-01-03 NOTE — ED PROVIDER NOTES
"      [facility] EMERGENCY DEPARTMENT ([facility])    1/03/22    ED19      History     Chief Complaint   Patient presents with     Headache     Nausea & Vomiting     Facial Pain     Patient has trigeminal neuralgia and was sent over by her neurogist     The history is provided by the patient and medical records.     Tonia Meeks is a 87 year old female who has a past medical history significant for trigeminal neuralgia, CAD, STEMI (2016), hypertension, hyperlipidemia, and bilateral macular degeneration who presents to the emergency department for evaluation of nausea and vomiting.  Patient presents with her son who supplements the history.  She reports having multiple bouts of trigeminal neuralgia in the past for which she has had multiple rhizotomies.  She reports that her pain started approximately 1 week ago.  She called Dr. Conklin's office and they told her to come to the ED.  Per chart review she was seen at  ED yesterday 1/2/2022 for her trigeminal neuralgia.  She was restarted on carbamazepine and prescribed Percocet for pain.  Subsequently she started to experience nausea and vomiting.  She says she has been vomiting \"all morning\".  She endorses pain on the right side of her forehead and rates his pain is a 10/10.  She states she has taken carbamazepine in the past and that did not alleviate the pain.  She also endorses having oxycodone in the past for her back surgery and says that it did not cause nausea or vomiting.  The patient denies vision changes though she has untreated macular degeneration.  Denies runny nose, cough, sore throat.  States she is allergic to sulfa.      Past Medical History  Past Medical History:   Diagnosis Date     CAD (coronary artery disease)      Fracture of right wrist 09/2016    from a fall     Fracture, ribs 09/2016    from a fall     Hyperlipidemia      Hypertension      Macular degeneration, bilateral      MI (myocardial infarction) (H)      Thoracic spine " fracture (H) 09/2016     Trigeminal neuralgia      Past Surgical History:   Procedure Laterality Date     APPENDECTOMY       BALLOON COMPRESSION RHIZOTOMY Right 12/17/2015    Procedure: BALLOON COMPRESSION RHIZOTOMY;  Surgeon: Nahid Salas MD;  Location: [facility] OR     CARDIAC SURGERY      stent     ENT SURGERY      tonsillectomy     EYE SURGERY      cataracts     GYN SURGERY      hysterectomy     HC ECP WITH CATARACT SURGERY Bilateral      IR RHIZOTOMY  1/30/2020     STENT       thoracic spine fusion with instrumentation  09/2016    due to a fall     carBAMazepine (TEGRETOL) 100 MG chewable tablet  acyclovir (ZOVIRAX) 5 % external cream  ATORVASTATIN CALCIUM PO  LISINOPRIL PO  METOPROLOL TARTRATE PO      Allergies   Allergen Reactions     Sulfa Drugs Unknown     Family History  History reviewed. No pertinent family history.  Social History   Social History     Tobacco Use     Smoking status: Never Smoker     Smokeless tobacco: Never Used   Substance Use Topics     Alcohol use: Not Currently     Alcohol/week: 0.0 standard drinks     Comment: couple of drinks/week     Drug use: No      Past medical history, past surgical history, medications, allergies, family history, and social history were reviewed with the patient. No additional pertinent items.       Review of Systems   Constitutional: Negative for chills and fever.   HENT: Negative for rhinorrhea and sore throat.    Eyes: Negative.  Negative for visual disturbance.   Respiratory: Negative for cough.    Gastrointestinal: Positive for nausea and vomiting. Negative for abdominal distention and abdominal pain.   All other systems reviewed and are negative.    A complete review of systems was performed with pertinent positives and negatives noted in the HPI, and all other systems negative.    Physical Exam   BP: (!) 180/82  Pulse: 108  Temp: 97.9  F (36.6  C)  Resp: 18  Weight: 53.5 kg (118 lb)  SpO2: 96 %  Physical Exam  Vitals and nursing note reviewed.    Constitutional:       Appearance: She is normal weight.      Comments: Holding emesis bag   HENT:      Head: Normocephalic and atraumatic.      Nose: No congestion or rhinorrhea.   Eyes:      Extraocular Movements: Extraocular movements intact.   Cardiovascular:      Rate and Rhythm: Tachycardia present.   Pulmonary:      Effort: Pulmonary effort is normal.   Musculoskeletal:         General: Normal range of motion.      Cervical back: Normal range of motion.   Skin:     General: Skin is warm and dry.      Coloration: Skin is not jaundiced.   Neurological:      General: No focal deficit present.      Mental Status: She is alert and oriented to person, place, and time.   Psychiatric:         Mood and Affect: Mood normal.         Behavior: Behavior normal.         Thought Content: Thought content normal.         Judgment: Judgment normal.         ED Course     12:49 PM  The patient was seen and examined by Shaniqua Hayden MD in Room ED19.     Procedures       The medical record was reviewed and interpreted.  Current labs reviewed and interpreted.              Results for orders placed or performed during the hospital encounter of 01/03/22   [place] Draw     Status: None ()    Narrative    The following orders were created for panel order [place] Draw.  Procedure                               Abnormality         Status                     ---------                               -----------         ------                     Extra Red Top Tube[[id number]]                                                          Extra Green Top (Lithium...[[id number]]                                                 Extra Purple Top Tube[[id number]]                                                         Please view results for these tests on the individual orders.     Medications   0.9% sodium chloride BOLUS (500 mLs Intravenous New Bag 1/3/22 0617)   sodium chloride 0.9 % infusion (has no administration in time range)   ondansetron (ZOFRAN)  injection 4 mg (4 mg Intravenous Given 1/3/22 1305)        Assessments & Plan (with Medical Decision Making)   The patient presents with n/v after starting percocet yesterday.  She is having another bout of trigeminal neuralgia and has been taking tegretol which hasn't been working.  She went to an ER yesterday and they gave her percocet.  She began vomiting immediately and this has persisted. She was unable to take her bp meds this am due to vomiting.  She says her trigeminal neuralgia pain is 10/10.  I received a phone call from NP from Neurosurgery who said they knew the patient was coming and wanted her to come to the [facility]. An iv was placed and zofran was given.  Basic labs sent.  She was then sent to [facility] ed to be seen by neurosurgery.  They told me to page #[phone] when she arrives in the [place] ED.  They know her well and said she may need to be admitted for pain control and possible surgery to help with her pain.  I called [facility] ED and gave report to Dr. Dupree.  She was also given dilaudid 0.2 mg IV for pain. I initially gave a small dose to see how she tolerated it.     I have reviewed the nursing notes. I have reviewed the findings, diagnosis, plan and need for follow up with the patient.    New Prescriptions    No medications on file       Final diagnoses:   Trigeminal neuralgia     IAbby, am serving as a trained medical scribe to document services personally performed by Shaniqua Hayden MD based on the provider's statements to me on January 3, 2022.  This document has been checked and approved by the attending provider.    Shaniqua ERSENDIZ MD, was physically present and have reviewed and verified the accuracy of this note documented by Abby Santiago medical scribe.      Shaniqua Hayden MD      --  Shaniqua Hayden MD  [facility] EMERGENCY DEPARTMENT  1/3/2022     Shaniqua Hayden MD  01/03/22 1316       Shaniqua Hayden MD  01/03/22 1411

## 2022-01-03 NOTE — TELEPHONE ENCOUNTER
DOS 1/30/20 Right sided Balloon Rhizotomy    Spoke with Elio, son.  States having right sided facial pain, crying all day and night, not sleeping, intense pain per patient. Patient went to [place] ED yesterday, received Morphine, went home and slept but pain returned when awoken.  Elio states patient taking the Carbamezapine 100mg 3x daily and aleve in between.  Right sided head and facial pain remain, patient nauseated and vomiting some too.  Unable to eat or drink much.  Discharged from ED to home yesterday.  Elio asking for patient to come to ED for help with facial pain.     Note sent to Dr Conklin, will get back to patient asap.

## 2022-01-03 NOTE — TELEPHONE ENCOUNTER
[facility] Call Center    Phone Message    May a detailed message be left on voicemail: yes     Reason for Call: Symptoms or Concerns     If patient has red-flag symptoms, warm transfer to triage line    Current symptom or concern: trigeminal neuralgia head pain, nausea     Symptoms have been present for: On going     Has patient previously been seen for this? Yes    By : Dr. Conklin        Are there any new or worsening symptoms? Yes: Patients son calling stating patient is in quite a bit of pain with the trigeminal neuralgia she has been diagnosed with and was in the ER yesterday at [facility] due to the pain, Patients son states she was given pain medication and an injection which helped her sleep but patient has been vomiting due to so many medications to control the pain. Please call as patients son does not think she is going to do well until her scheduled appointment on 1/10/2022 with Dr. Conklin.       Action Taken: Message routed to:  [facility]): neurosurgery    Travel Screening: Not Applicable

## 2022-01-03 NOTE — TELEPHONE ENCOUNTER
DOS 1/30/20 Right sided Balloon Rhizotomy.    Per Dr Conklin, patient can come to [facility] ED for Right sided head and facial pain refractory to medication, now has N&V, unable to keep fluids down, with severe right sided facial pains.    Spoke with Elio, Elio will be bringing patient to [facility] ED for treatment.    NS Resident on call notified.

## 2022-01-03 NOTE — ED NOTES
I took signout of the patient from Dr. Hayden on the [facility].  There is an 87-year-old female who presented for trigeminal neuralgia.  Patient was transferred to [place] to be seen by Neurosurgery.  She has had previous rhizotomies in the past.  Patient was seen by Neurology who recommends starting fosphenytoin and will admit to their service for plan for rhizotomy tomorrow.     Cipriano Dupree,   01/03/22 5473

## 2022-01-03 NOTE — PLAN OF CARE
Status: Pt here for worsening facial pain, hx of trigeminal neuralgia and rhizotomies, CAD, STEMI, HTN, HLD, macular degeneration. Per report, mild dementia at baseline. Lives in assisted living with .   VS: HTN otherwise VSS, on RA. MD notified of BP outside of parameters, meds not yet verified by pharmacist.   Neuros: A/OX4, forgetful. Hoopa bilaterally. 5/5 throughout. Facial pain noted but denies N/T.   GI: Regular diet, NPO at midnight. BS audible throughout, last BM 1/2/22. Nausea intermittent, improved with IV Zofran.   : Voiding spontaneously per report.   Respiratory: On RA, clear lung sounds.   Skin: Scabbing throughout, dry/scaly otherwise WNL.   IV: 2 PIV, one SL and one infusing NS 50 ml/hr.   Activity: A1, GB.   Pain: Facial pain 10/10 and lower back pain, somewhat improved by IV dilaudid and PRN oxy.   Plan of care:  NPO at midnight, plan for rhizotomy tomorrow per NSG. Consented by NSG. Continue to monitor and follow POC.

## 2022-01-03 NOTE — H&P
[facility]       H&P NOTE    This consultation was requested by Dr. Shaniqua Hayden from the Emergency Medicine service.        Reason for Consultation  Recurrent trigeminal neuralgia     HPI:  Tonia Meeks is a 87 year old female with history of trigeminal neuralgia, CAD, STEMI (2016), hypertension, hyperlipidemia, and bilateral macular degeneration who presents to the Emergency Department today with complaints of 10/10 facial pain, nausea and vomtining.  The patient has history of right-sided trigeminal neuralgia status post balloon rhizotomy in 2015 with Dr. Salas and with Dr. Cipriano Conklin in 2020.  The patient was able to successfully wean from Carbamazepine medication following the procedure in 2020.  Unfortunately, the patient's pain began to return 5 days ago.  Carbamazepine 50 mg three times daily was prescribed, however the pain was refractory to the medication.  The patient presented to an outside ED yesterday for complaints of facial pain and was discharged with pain medication and Carbamazepine dose was increased.  Unfortunately the patient awoke this morning with worsening pain.   Currently the patient describes pain in a V2 distribution and partial V1 distribution on the right.  The patient states she is unable to tolerate food due to mastication, brushing her teeth,  or pressure to the area.  The patient did take Norco earlier today for the pain, however this made her quite nauseated resulting in emesis.        PAST MEDICAL HISTORY:   Past Medical History:   Diagnosis Date     CAD (coronary artery disease)      Fracture of right wrist 09/2016    from a fall     Fracture, ribs 09/2016    from a fall     Hyperlipidemia      Hypertension      Macular degeneration, bilateral      MI (myocardial infarction) (H)      Thoracic spine fracture (H) 09/2016     Trigeminal neuralgia        PAST SURGICAL HISTORY:   Past Surgical History:   Procedure Laterality Date      APPENDECTOMY       BALLOON COMPRESSION RHIZOTOMY Right 12/17/2015    Procedure: BALLOON COMPRESSION RHIZOTOMY;  Surgeon: Nahid Salas MD;  Location: [facility] OR     CARDIAC SURGERY      stent     ENT SURGERY      tonsillectomy     EYE SURGERY      cataracts     GYN SURGERY      hysterectomy     HC ECP WITH CATARACT SURGERY Bilateral      IR RHIZOTOMY  1/30/2020     STENT       thoracic spine fusion with instrumentation  09/2016    due to a fall       FAMILY HISTORY: History reviewed. No pertinent family history.    SOCIAL HISTORY:   Social History     Tobacco Use     Smoking status: Never Smoker     Smokeless tobacco: Never Used   Substance Use Topics     Alcohol use: Not Currently     Alcohol/week: 0.0 standard drinks     Comment: couple of drinks/week       MEDICATIONS:  Current Outpatient Medications   Medication Sig Dispense Refill     carBAMazepine (TEGRETOL) 100 MG chewable tablet Take 1 tablet in the morning, take  1/2 tablet (50mg) at noon, and  1/2 tablet (50mg) at HS 45 tablet 2     acyclovir (ZOVIRAX) 5 % external cream Apply topically 5 times daily 5 g 3     ATORVASTATIN CALCIUM PO Take 40 mg by mouth daily        LISINOPRIL PO Take 10 mg by mouth daily        METOPROLOL TARTRATE PO Take 25 mg by mouth daily ER         Allergies:  Allergies   Allergen Reactions     Sulfa Drugs Unknown       ROS: 10 point ROS of systems including Constitutional, Eyes, Respiratory, Cardiovascular, Gastroenterology, Genitourinary, Integumentary, Muscularskeletal, Psychiatric were all negative except for pertinent positives noted in my HPI.    Physical exam:   Blood pressure (!) 180/82, pulse 108, temperature 97.9  F (36.6  C), temperature source Tympanic, resp. rate 18, weight 53.5 kg (118 lb), SpO2 96 %, not currently breastfeeding.  General: awake and alert  HEENT: atraumatic   PULM: breathing comfortably on room air  MSK: normal bulk and tone  NEUROLOGIC:  -- Awake; Alert; oriented x 3  -- Follows commands  briskly  -- +repetition, calculation, and naming  -- Speech fluent, spontaneous. No aphasia or dysarthria.  -- no gaze preference. No apparent hemineglect.  Cranial Nerves:  -- visual fields full to confrontation, PERRL 3-2mm bilat and brisk, extraocular movements intact  -- face symmetrical, tongue midline  -- sensory V1-V3 intact bilaterally  -- palate elevates symmetrically, uvula midline  -- hearing grossly intact bilat  -- Trapezii 5/5 strength bilat symmetric  -- Cerebellar: Finger nose finger without dysmetria, intact rapid alternating motions bilaterally    Motor:  Normal bulk / tone; no tremor, rigidity, or bradykinesia.  No muscle wasting or fasciculations  No Pronator Drift     Delt Bi Tri Hand Flexion/  Extension Iliopsoas Quadriceps Hamstrings Tibialis Anterior Gastroc    C5 C6 C7 C8/T1 L2 L3 L4-S1 L4 S1   R 5 5 5 5 5 5 5 5 5   L 5 5 5 5 5 5 5 5 5     Sensory:  intact to LT x 4 extremities            Bi Tri BR Nahum Pat Ach Bab     C5-6 C7-8 C6 UMN L2-4 S1 UMN   R 2+ 2+ 2+ Norm 2+ 2+ Norm   L 2+ 2+ 2+ Norm 2+ 2+ Norm      Gait: Normal.             LABS:   Last Comprehensive Metabolic Panel:  Sodium   Date Value Ref Range Status   09/12/2019 137 133 - 144 mmol/L Final     Potassium   Date Value Ref Range Status   01/30/2020 3.7 3.4 - 5.3 mmol/L Final     Chloride   Date Value Ref Range Status   09/12/2019 104 94 - 109 mmol/L Final     Carbon Dioxide   Date Value Ref Range Status   09/12/2019 29 20 - 32 mmol/L Final     Anion Gap   Date Value Ref Range Status   09/12/2019 4 3 - 14 mmol/L Final     Glucose   Date Value Ref Range Status   09/12/2019 105 (H) 70 - 99 mg/dL Final     Urea Nitrogen   Date Value Ref Range Status   09/12/2019 13 7 - 30 mg/dL Final     Creatinine   Date Value Ref Range Status   01/30/2020 0.80 0.52 - 1.04 mg/dL Final     GFR Estimate   Date Value Ref Range Status   01/30/2020 67 >60 mL/min/[1.73_m2] Final     Comment:     Non  GFR Calc  Starting 12/18/2018, serum  creatinine based estimated GFR (eGFR) will be   calculated using the Chronic Kidney Disease Epidemiology Collaboration   (CKD-EPI) equation.       Calcium   Date Value Ref Range Status   09/12/2019 9.0 8.5 - 10.1 mg/dL Final     Lab Results   Component Value Date    WBC 5.6 09/12/2019     Lab Results   Component Value Date    RBC 4.62 09/12/2019     Lab Results   Component Value Date    HGB 13.7 09/12/2019     Lab Results   Component Value Date    HCT 42.0 09/12/2019     Lab Results   Component Value Date    MCV 91 09/12/2019     Lab Results   Component Value Date    MCH 29.7 09/12/2019     Lab Results   Component Value Date    MCHC 32.6 09/12/2019     Lab Results   Component Value Date    RDW 12.5 09/12/2019     Lab Results   Component Value Date     09/12/2019     No results found for: INR   No results found for: PTT       ASSESSMENT:      In addition to the assessment of diagnoses detailed above, this 87 year old female  patient admitted from the Emergency Department has the following conditions contributing to the complexity of their medical care:    None         PLAN:  Admit to 6A   Right sided radiofrequency rhizotomy with Dr. Conklin on 1/04/2022 @ 12:30 pm.   Will load patient with Fosphenytoin infusion in the meantime for pain control  Activity: up as tolerated  Q4 hour neuro exams   Pain control  Maintain SBP < 160   Continuous pulse oximetry  Supplemental oxygen PRN  Diet as tolerated  NPO after midnight in anticipation for rhizotomy procedure   Bowel regimen. PRN anti-emetics.            KENYA Rodriguez, CNP  Department of Neurosurgery  Pager: [phone]      The patient was discussed with Dr. Gaston Banegas, neurosurgery chief resident, and Dr. Cipriano Conklin, neurosurgery staff.

## 2022-01-03 NOTE — ED TRIAGE NOTES
Patient seen at another ED yesterday and was prescribed Norco and  Carbamazepine. Patient took a dose of norco and since then N & V has gotten worst.

## 2022-01-04 ENCOUNTER — ANESTHESIA EVENT (OUTPATIENT)
Dept: SURGERY | Facility: CLINIC | Age: 87
DRG: 074 | End: 2022-01-04
Payer: COMMERCIAL

## 2022-01-04 ENCOUNTER — APPOINTMENT (OUTPATIENT)
Dept: INTERVENTIONAL RADIOLOGY/VASCULAR | Facility: CLINIC | Age: 87
DRG: 074 | End: 2022-01-04
Attending: NURSE PRACTITIONER
Payer: COMMERCIAL

## 2022-01-04 ENCOUNTER — ANESTHESIA (OUTPATIENT)
Dept: SURGERY | Facility: CLINIC | Age: 87
DRG: 074 | End: 2022-01-04
Payer: COMMERCIAL

## 2022-01-04 LAB
ABO/RH(D): NORMAL
ANION GAP SERPL CALCULATED.3IONS-SCNC: 8 MMOL/L (ref 3–14)
ANTIBODY SCREEN: NEGATIVE
APTT PPP: 27 SECONDS (ref 22–38)
APTT PPP: 28 SECONDS (ref 22–38)
BUN SERPL-MCNC: 10 MG/DL (ref 7–30)
CALCIUM SERPL-MCNC: 8 MG/DL (ref 8.5–10.1)
CHLORIDE BLD-SCNC: 108 MMOL/L (ref 94–109)
CO2 SERPL-SCNC: 23 MMOL/L (ref 20–32)
CREAT SERPL-MCNC: 0.62 MG/DL (ref 0.52–1.04)
ERYTHROCYTE [DISTWIDTH] IN BLOOD BY AUTOMATED COUNT: 12.9 % (ref 10–15)
GFR SERPL CREATININE-BSD FRML MDRD: 86 ML/MIN/1.73M2
GLUCOSE BLD-MCNC: 106 MG/DL (ref 70–99)
HCT VFR BLD AUTO: 36.3 % (ref 35–47)
HGB BLD-MCNC: 11.9 G/DL (ref 11.7–15.7)
HOLD SPECIMEN: NORMAL
INR PPP: 1.01 (ref 0.85–1.15)
INR PPP: 1.11 (ref 0.85–1.15)
MCH RBC QN AUTO: 29.6 PG (ref 26.5–33)
MCHC RBC AUTO-ENTMCNC: 32.8 G/DL (ref 31.5–36.5)
MCV RBC AUTO: 90 FL (ref 78–100)
PLATELET # BLD AUTO: 151 10E3/UL (ref 150–450)
POTASSIUM BLD-SCNC: 3.5 MMOL/L (ref 3.4–5.3)
RBC # BLD AUTO: 4.02 10E6/UL (ref 3.8–5.2)
SODIUM SERPL-SCNC: 139 MMOL/L (ref 133–144)
SPECIMEN EXPIRATION DATE: NORMAL
WBC # BLD AUTO: 7.6 10E3/UL (ref 4–11)

## 2022-01-04 PROCEDURE — 80048 BASIC METABOLIC PNL TOTAL CA: CPT | Performed by: NURSE PRACTITIONER

## 2022-01-04 PROCEDURE — 272N000138 HC DEVICE INFLATION CR6

## 2022-01-04 PROCEDURE — 77002 NEEDLE LOCALIZATION BY XRAY: CPT

## 2022-01-04 PROCEDURE — 710N000010 HC RECOVERY PHASE 1, LEVEL 2, PER MIN

## 2022-01-04 PROCEDURE — 250N000009 HC RX 250: Performed by: NURSE ANESTHETIST, CERTIFIED REGISTERED

## 2022-01-04 PROCEDURE — 999N000141 HC STATISTIC PRE-PROCEDURE NURSING ASSESSMENT

## 2022-01-04 PROCEDURE — 86901 BLOOD TYPING SEROLOGIC RH(D): CPT | Performed by: STUDENT IN AN ORGANIZED HEALTH CARE EDUCATION/TRAINING PROGRAM

## 2022-01-04 PROCEDURE — 250N000013 HC RX MED GY IP 250 OP 250 PS 637: Performed by: NURSE PRACTITIONER

## 2022-01-04 PROCEDURE — 36415 COLL VENOUS BLD VENIPUNCTURE: CPT | Performed by: STUDENT IN AN ORGANIZED HEALTH CARE EDUCATION/TRAINING PROGRAM

## 2022-01-04 PROCEDURE — 61790 TREAT TRIGEMINAL NERVE: CPT | Mod: RT | Performed by: NEUROLOGICAL SURGERY

## 2022-01-04 PROCEDURE — 258N000003 HC RX IP 258 OP 636

## 2022-01-04 PROCEDURE — 99207 PR PREOP VISIT IN GLOBAL PKG: CPT | Performed by: NURSE PRACTITIONER

## 2022-01-04 PROCEDURE — 272N000500 HC NEEDLE CR2

## 2022-01-04 PROCEDURE — 85610 PROTHROMBIN TIME: CPT | Performed by: STUDENT IN AN ORGANIZED HEALTH CARE EDUCATION/TRAINING PROGRAM

## 2022-01-04 PROCEDURE — 250N000013 HC RX MED GY IP 250 OP 250 PS 637: Performed by: NEUROLOGICAL SURGERY

## 2022-01-04 PROCEDURE — 370N000017 HC ANESTHESIA TECHNICAL FEE, PER MIN

## 2022-01-04 PROCEDURE — 272N000531 HC PAD DEFIB QUICK CR4

## 2022-01-04 PROCEDURE — 85014 HEMATOCRIT: CPT | Performed by: NURSE PRACTITIONER

## 2022-01-04 PROCEDURE — 85730 THROMBOPLASTIN TIME PARTIAL: CPT | Performed by: STUDENT IN AN ORGANIZED HEALTH CARE EDUCATION/TRAINING PROGRAM

## 2022-01-04 PROCEDURE — 272N000209 HC BALLOON (NON-PTA) CR6

## 2022-01-04 PROCEDURE — 250N000025 HC SEVOFLURANE, PER MIN

## 2022-01-04 PROCEDURE — 36415 COLL VENOUS BLD VENIPUNCTURE: CPT | Performed by: NURSE PRACTITIONER

## 2022-01-04 PROCEDURE — 120N000002 HC R&B MED SURG/OB UMMC

## 2022-01-04 PROCEDURE — 250N000011 HC RX IP 250 OP 636: Performed by: NURSE PRACTITIONER

## 2022-01-04 PROCEDURE — 005K3ZZ DESTRUCTION OF TRIGEMINAL NERVE, PERCUTANEOUS APPROACH: ICD-10-PCS | Performed by: NEUROLOGICAL SURGERY

## 2022-01-04 PROCEDURE — 250N000011 HC RX IP 250 OP 636: Performed by: NURSE ANESTHETIST, CERTIFIED REGISTERED

## 2022-01-04 PROCEDURE — 85730 THROMBOPLASTIN TIME PARTIAL: CPT | Performed by: NURSE PRACTITIONER

## 2022-01-04 PROCEDURE — 258N000003 HC RX IP 258 OP 636: Performed by: NURSE ANESTHETIST, CERTIFIED REGISTERED

## 2022-01-04 PROCEDURE — 85610 PROTHROMBIN TIME: CPT | Performed by: NURSE PRACTITIONER

## 2022-01-04 PROCEDURE — 250N000011 HC RX IP 250 OP 636: Performed by: STUDENT IN AN ORGANIZED HEALTH CARE EDUCATION/TRAINING PROGRAM

## 2022-01-04 PROCEDURE — 250N000013 HC RX MED GY IP 250 OP 250 PS 637

## 2022-01-04 PROCEDURE — 250N000024 HC ISOFLURANE, PER MIN

## 2022-01-04 PROCEDURE — 250N000011 HC RX IP 250 OP 636: Performed by: ANESTHESIOLOGY

## 2022-01-04 RX ORDER — ONDANSETRON 2 MG/ML
INJECTION INTRAMUSCULAR; INTRAVENOUS PRN
Status: DISCONTINUED | OUTPATIENT
Start: 2022-01-04 | End: 2022-01-04

## 2022-01-04 RX ORDER — SODIUM CHLORIDE, SODIUM LACTATE, POTASSIUM CHLORIDE, CALCIUM CHLORIDE 600; 310; 30; 20 MG/100ML; MG/100ML; MG/100ML; MG/100ML
INJECTION, SOLUTION INTRAVENOUS CONTINUOUS PRN
Status: DISCONTINUED | OUTPATIENT
Start: 2022-01-04 | End: 2022-01-04

## 2022-01-04 RX ORDER — NALOXONE HYDROCHLORIDE 0.4 MG/ML
0.4 INJECTION, SOLUTION INTRAMUSCULAR; INTRAVENOUS; SUBCUTANEOUS
Status: DISCONTINUED | OUTPATIENT
Start: 2022-01-04 | End: 2022-01-05 | Stop reason: HOSPADM

## 2022-01-04 RX ORDER — ACETAMINOPHEN 325 MG/1
650 TABLET ORAL
Status: DISCONTINUED | OUTPATIENT
Start: 2022-01-04 | End: 2022-01-04

## 2022-01-04 RX ORDER — SODIUM CHLORIDE, SODIUM LACTATE, POTASSIUM CHLORIDE, CALCIUM CHLORIDE 600; 310; 30; 20 MG/100ML; MG/100ML; MG/100ML; MG/100ML
INJECTION, SOLUTION INTRAVENOUS CONTINUOUS
Status: DISCONTINUED | OUTPATIENT
Start: 2022-01-04 | End: 2022-01-04

## 2022-01-04 RX ORDER — PROPOFOL 10 MG/ML
INJECTION, EMULSION INTRAVENOUS PRN
Status: DISCONTINUED | OUTPATIENT
Start: 2022-01-04 | End: 2022-01-04

## 2022-01-04 RX ORDER — ONDANSETRON 4 MG/1
4 TABLET, ORALLY DISINTEGRATING ORAL EVERY 30 MIN PRN
Status: DISCONTINUED | OUTPATIENT
Start: 2022-01-04 | End: 2022-01-04

## 2022-01-04 RX ORDER — ONDANSETRON 2 MG/ML
4 INJECTION INTRAMUSCULAR; INTRAVENOUS EVERY 30 MIN PRN
Status: DISCONTINUED | OUTPATIENT
Start: 2022-01-04 | End: 2022-01-04

## 2022-01-04 RX ORDER — FENTANYL CITRATE 50 UG/ML
25 INJECTION, SOLUTION INTRAMUSCULAR; INTRAVENOUS
Status: DISCONTINUED | OUTPATIENT
Start: 2022-01-04 | End: 2022-01-04

## 2022-01-04 RX ORDER — HYDROMORPHONE HCL IN WATER/PF 6 MG/30 ML
0.2 PATIENT CONTROLLED ANALGESIA SYRINGE INTRAVENOUS EVERY 5 MIN PRN
Status: DISCONTINUED | OUTPATIENT
Start: 2022-01-04 | End: 2022-01-04

## 2022-01-04 RX ORDER — ALBUTEROL SULFATE 0.83 MG/ML
2.5 SOLUTION RESPIRATORY (INHALATION) EVERY 4 HOURS PRN
Status: DISCONTINUED | OUTPATIENT
Start: 2022-01-04 | End: 2022-01-04

## 2022-01-04 RX ORDER — NALOXONE HYDROCHLORIDE 0.4 MG/ML
0.2 INJECTION, SOLUTION INTRAMUSCULAR; INTRAVENOUS; SUBCUTANEOUS
Status: DISCONTINUED | OUTPATIENT
Start: 2022-01-04 | End: 2022-01-05 | Stop reason: HOSPADM

## 2022-01-04 RX ORDER — LIDOCAINE HYDROCHLORIDE 20 MG/ML
INJECTION, SOLUTION INFILTRATION; PERINEURAL PRN
Status: DISCONTINUED | OUTPATIENT
Start: 2022-01-04 | End: 2022-01-04

## 2022-01-04 RX ORDER — CEFAZOLIN SODIUM 2 G/100ML
2 INJECTION, SOLUTION INTRAVENOUS SEE ADMIN INSTRUCTIONS
Status: DISCONTINUED | OUTPATIENT
Start: 2022-01-04 | End: 2022-01-04

## 2022-01-04 RX ORDER — CEFAZOLIN SODIUM 2 G/100ML
2 INJECTION, SOLUTION INTRAVENOUS
Status: COMPLETED | OUTPATIENT
Start: 2022-01-04 | End: 2022-01-04

## 2022-01-04 RX ORDER — SODIUM CHLORIDE 9 MG/ML
INJECTION, SOLUTION INTRAVENOUS CONTINUOUS
Status: ACTIVE | OUTPATIENT
Start: 2022-01-04 | End: 2022-01-04

## 2022-01-04 RX ORDER — MEPERIDINE HYDROCHLORIDE 25 MG/ML
12.5 INJECTION INTRAMUSCULAR; INTRAVENOUS; SUBCUTANEOUS
Status: DISCONTINUED | OUTPATIENT
Start: 2022-01-04 | End: 2022-01-04

## 2022-01-04 RX ORDER — OXYCODONE HYDROCHLORIDE 5 MG/1
5 TABLET ORAL EVERY 4 HOURS PRN
Status: DISCONTINUED | OUTPATIENT
Start: 2022-01-04 | End: 2022-01-04

## 2022-01-04 RX ORDER — LIDOCAINE 40 MG/G
CREAM TOPICAL
Status: DISCONTINUED | OUTPATIENT
Start: 2022-01-04 | End: 2022-01-04

## 2022-01-04 RX ORDER — FENTANYL CITRATE 50 UG/ML
INJECTION, SOLUTION INTRAMUSCULAR; INTRAVENOUS PRN
Status: DISCONTINUED | OUTPATIENT
Start: 2022-01-04 | End: 2022-01-04

## 2022-01-04 RX ORDER — FENTANYL CITRATE 50 UG/ML
25 INJECTION, SOLUTION INTRAMUSCULAR; INTRAVENOUS EVERY 5 MIN PRN
Status: DISCONTINUED | OUTPATIENT
Start: 2022-01-04 | End: 2022-01-04

## 2022-01-04 RX ADMIN — ONDANSETRON 4 MG: 2 INJECTION INTRAMUSCULAR; INTRAVENOUS at 14:52

## 2022-01-04 RX ADMIN — ROCURONIUM BROMIDE 5 MG: 50 INJECTION, SOLUTION INTRAVENOUS at 14:20

## 2022-01-04 RX ADMIN — HYDROMORPHONE HYDROCHLORIDE 0.2 MG: 0.2 INJECTION, SOLUTION INTRAMUSCULAR; INTRAVENOUS; SUBCUTANEOUS at 03:50

## 2022-01-04 RX ADMIN — Medication 50 MG: at 17:33

## 2022-01-04 RX ADMIN — OXYCODONE HYDROCHLORIDE 5 MG: 5 TABLET ORAL at 06:16

## 2022-01-04 RX ADMIN — FENTANYL CITRATE 100 MCG: 50 INJECTION, SOLUTION INTRAMUSCULAR; INTRAVENOUS at 14:20

## 2022-01-04 RX ADMIN — LISINOPRIL 5 MG: 5 TABLET ORAL at 08:29

## 2022-01-04 RX ADMIN — METOPROLOL SUCCINATE 25 MG: 25 TABLET, FILM COATED, EXTENDED RELEASE ORAL at 08:29

## 2022-01-04 RX ADMIN — SODIUM CHLORIDE: 9 INJECTION, SOLUTION INTRAVENOUS at 17:17

## 2022-01-04 RX ADMIN — CARBAMAZEPINE 100 MG: 100 TABLET, CHEWABLE ORAL at 08:29

## 2022-01-04 RX ADMIN — ONDANSETRON 4 MG: 2 INJECTION INTRAMUSCULAR; INTRAVENOUS at 16:01

## 2022-01-04 RX ADMIN — PROPOFOL 70 MG: 10 INJECTION, EMULSION INTRAVENOUS at 14:22

## 2022-01-04 RX ADMIN — Medication 50 MG: at 21:54

## 2022-01-04 RX ADMIN — ONDANSETRON 4 MG: 2 INJECTION INTRAMUSCULAR; INTRAVENOUS at 05:44

## 2022-01-04 RX ADMIN — SODIUM CHLORIDE, POTASSIUM CHLORIDE, SODIUM LACTATE AND CALCIUM CHLORIDE: 600; 310; 30; 20 INJECTION, SOLUTION INTRAVENOUS at 14:04

## 2022-01-04 RX ADMIN — ATORVASTATIN CALCIUM 20 MG: 20 TABLET, FILM COATED ORAL at 08:29

## 2022-01-04 RX ADMIN — Medication 2 G: at 14:31

## 2022-01-04 RX ADMIN — SUGAMMADEX 200 MG: 100 INJECTION, SOLUTION INTRAVENOUS at 14:52

## 2022-01-04 RX ADMIN — ROCURONIUM BROMIDE 45 MG: 50 INJECTION, SOLUTION INTRAVENOUS at 14:22

## 2022-01-04 RX ADMIN — LIDOCAINE HYDROCHLORIDE 60 MG: 20 INJECTION, SOLUTION INFILTRATION; PERINEURAL at 14:20

## 2022-01-04 ASSESSMENT — ACTIVITIES OF DAILY LIVING (ADL)
ADLS_ACUITY_SCORE: 8
ADLS_ACUITY_SCORE: 9
ADLS_ACUITY_SCORE: 6
ADLS_ACUITY_SCORE: 6
ADLS_ACUITY_SCORE: 11
ADLS_ACUITY_SCORE: 6
ADLS_ACUITY_SCORE: 9
ADLS_ACUITY_SCORE: 6
ADLS_ACUITY_SCORE: 6
ADLS_ACUITY_SCORE: 8
ADLS_ACUITY_SCORE: 10
ADLS_ACUITY_SCORE: 8
ADLS_ACUITY_SCORE: 9
ADLS_ACUITY_SCORE: 6
ADLS_ACUITY_SCORE: 6
ADLS_ACUITY_SCORE: 9
ADLS_ACUITY_SCORE: 9
ADLS_ACUITY_SCORE: 6
ADLS_ACUITY_SCORE: 10
ADLS_ACUITY_SCORE: 11
ADLS_ACUITY_SCORE: 6
ADLS_ACUITY_SCORE: 11
ADLS_ACUITY_SCORE: 11
ADLS_ACUITY_SCORE: 6

## 2022-01-04 ASSESSMENT — ENCOUNTER SYMPTOMS
DYSRHYTHMIAS: 0
ORTHOPNEA: 0

## 2022-01-04 NOTE — ANESTHESIA POSTPROCEDURE EVALUATION
Patient: Tonia Meeks    Procedure: Procedure(s):  ANESTHESIA OUT OF OR Right Balloon Rhizotomy @1230       Diagnosis:Trigeminal neuralgia [G50.0]  Diagnosis Additional Information: No value filed.    Anesthesia Type:  General    Note:  Disposition: Inpatient   Postop Pain Control: Uneventful            Sign Out: Well controlled pain   PONV: No   Neuro/Psych: Uneventful            Sign Out: Acceptable/Baseline neuro status   Airway/Respiratory: Uneventful            Sign Out: Acceptable/Baseline resp. status   CV/Hemodynamics: Uneventful            Sign Out: Acceptable CV status; No obvious hypovolemia; No obvious fluid overload   Other NRE: NONE   DID A NON-ROUTINE EVENT OCCUR? No    Event details/Postop Comments:  Awake, comfortable, oriented to place, procedure; satisfactory recovery from GA.    Inge Archer MD  1/4/2022  4:25 PM           Last vitals:  Vitals Value Taken Time   /86 01/04/22 1615   Temp 36.7  C (98  F) 01/04/22 1600   Pulse 73 01/04/22 1618   Resp 18 01/04/22 1600   SpO2 94 % 01/04/22 1622   Vitals shown include unvalidated device data.    Electronically Signed By: Inge Archer MD  January 4, 2022  4:25 PM

## 2022-01-04 NOTE — ANESTHESIA CARE TRANSFER NOTE
Patient: Tonia Meeks    Procedure: Procedure(s):  ANESTHESIA OUT OF OR Right Balloon Rhizotomy @1230       Diagnosis: Trigeminal neuralgia [G50.0]  Diagnosis Additional Information: No value filed.    Anesthesia Type:   General     Note:    Oropharynx: oropharynx clear of all foreign objects  Level of Consciousness: awake  Oxygen Supplementation: face mask    Independent Airway: airway patency satisfactory and stable  Dentition: dentition unchanged  Vital Signs Stable: post-procedure vital signs reviewed and stable  Report to RN Given: handoff report given  Patient transferred to: PACU    Handoff Report: Identifed the Patient, Identified the Reponsible Provider, Reviewed the pertinent medical history, Discussed the surgical course, Reviewed Intra-OP anesthesia mangement and issues during anesthesia, Set expectations for post-procedure period and Allowed opportunity for questions and acknowledgement of understanding      Vitals:  Vitals Value Taken Time   BP     Temp     Pulse     Resp     SpO2 97 % 01/04/22 1508   Vitals shown include unvalidated device data.    Electronically Signed By: KENYA Madsen CRNA  January 4, 2022  3:10 PM

## 2022-01-04 NOTE — ANESTHESIA PROCEDURE NOTES
Airway       Patient location during procedure: OR       Procedure Start/Stop Times: 1/4/2022 2:24 PM  Staff -        CRNA: Denise Pearl APRN CRNA       Performed By: CRNA  Consent for Airway        Urgency: elective  Indications and Patient Condition       Indications for airway management: gio-procedural         Mask difficulty assessment: 1 - vent by mask    Final Airway Details       Final airway type: endotracheal airway       Successful airway: ETT - single  Endotracheal Airway Details        Cuffed: yes       Successful intubation technique: direct laryngoscopy       DL Blade Type: MAC 3       Grade View of Cords: 1       Adjucts: stylet       Position: Right       Measured from: lips       Bite block used: None    Post intubation assessment        Placement verified by: capnometry, equal breath sounds and chest rise        Number of attempts at approach: 1       Secured with: plastic tape       Ease of procedure: easy       Dentition: Intact and Unchanged

## 2022-01-04 NOTE — OP NOTE
Procedure Date: 01/04/2022    SURGEON:  Cipriano Conklin MD    PREOPERATIVE DIAGNOSIS:  Recurrent right sided V1, V2 trigeminal neuralgia.    POSTOPERATIVE DIAGNOSIS:  Recurrent right sided V1, V2 trigeminal neuralgia.    PROCEDURE PERFORMED:  Right-sided percutaneous stereotactic balloon rhizotomy.    ANESTHESIA:  General endotracheal anesthesia.    ESTIMATED BLOOD LOSS:  Less than 1 mL    PREOPERATIVE HISTORY:  Tonia is an 87-year-old woman who has a history of right sided trigeminal neuralgia.  Yesterday, she presented to the Emergency Department in extremis because of her trigeminal neuralgia.  Recently, she has been having severe pain, which is intolerable at home and we directed her to the Emergency Department.  Medication has not been beneficial.  We evaluated her and found that she had typical trigeminal neuralgia and this was a recurrence.  Given the fact that she did not have significant numbness, we felt that a repeat balloon rhizotomy was appropriate.  We discussed the risks and benefits of this procedure including injury to the carotid artery, cranial nerve injury, bleeding in and around the brain, death and anesthesia dolorosa.  She understood the risks and benefits and wished to proceed.    DESCRIPTION OF PROCEDURE:  She was brought to the angiography suite where she was positioned in a supine fashion.  Hartel's landmarks were marked on the right side of the face.  This was then prepped and draped in the usual sterile fashion.  After a timeout, we then used a large needle and made a stab incision in her cheek.  We then passed a large trocar through the cheek up to the foramen ovale.  We removed the inner stylet and then passed a blunt stylet through the foramen ovale to create a path for our Conchis balloon.  We removed the inner stylet and then passed a 4-[place] Conchis balloon with the distal tip in line with the clivus.  We then wet prepped the balloon and then inflated it under fluoroscopic  guidance to 1.8 atmospheres of pressure.  We then held the pressure for 2 minutes.  During this time, it drifted down to approximately 1.6 atmospheres of pressure.  After 2 minutes, we then deflated the balloon completely and removed the balloon and the stylette together.  We then held pressure on the cheek for 4 minutes.  No bleeding was found after that.    I attest I was present for the entire duration of the procedure.    Cipriano Conklin MD        D: 2022   T: 2022   MT: RYAN    Name:     ANKIT DUMONTKalyn  MRN:      [id number]        Account:        [id number]   :      1934           Procedure Date: 2022     Document: [id number]

## 2022-01-04 NOTE — BRIEF OP NOTE
[facility]    Brief Operative Note    Pre-operative diagnosis: Trigeminal neuralgia [G50.0]  Post-operative diagnosis Same as pre-operative diagnosis    Procedure: Procedure(s):  ANESTHESIA OUT OF OR Right Balloon Rhizotomy @1230  Surgeon: Surgeon(s) and Role:     * GENERIC ANESTHESIA PROVIDER - Primary     * Cipriano Conklin MD - Assisting   Sabrina Hernadnez MD- Resident- Assisting  Anesthesia: General anesthesia   Estimated Blood Loss: 1 mL    Drains:  none  Specimens: none  Findings:  Right foramen ovale identified on fluoroscopy; balloon guided into foramen and inflated for 2 minutes  Complications: None   Implants: * No implants in log *      Sabrina Hernandez MD  Neurosurgery PGY-1

## 2022-01-04 NOTE — PLAN OF CARE
[id number]  Status: Pt here for worsening facial pain, hx of trigeminal neuralgia and rhizotomies, CAD, STEMI, HTN, HLD, macular degeneration. Per report, mild dementia at baseline. Lives in assisted living with .   VS: VSS on RA ex HTN within parameters of 160.  Neuros: A&OX4, forgetful. [place] bilaterally, hearing aid in cup @ bedside. 5/5 throughout. Facial pain noted but denies N/T.   GI: Regular diet, NPO at midnight. BS+, LBM 1/2/22. Nausea intermittent, no Zofran needed this shift.  : Voiding spontaneously.  Respiratory: WNL  Skin: Scabbing throughout, dry/scaly skin  IV: 2 PIV, one SL and one infusing NS 50 ml/hr.   Activity: A1, GB.   Pain: Facial pain and lower back pain partially controlled with IV dilaudid and PRN oxy. Patient still stating 9/10 pain after medications given. MD notified, but d/t surgery tomorrow they want to stick to the fosphenytoin, dilaudid, and PRN oxy. Scheduled Fosphenytoin infusion most effective for pain. Patient stated they can sleep with pain at this level, but wants to be woken up overnight for PRN pain medications around clock. Board updated with available times.  Plan of care: Continue to monitor and follow POC. NPO at midnight, plan for rhizotomy tomorrow per NSG. Consented by NSG.

## 2022-01-04 NOTE — PROVIDER NOTIFICATION
"\"Tera rm 218 6A NSG     Pt having small, frequent urinations with high PVR (last at 0240 for >699mL). Also reporting bladder pain. Thanks Silas [id number]\"        Paged neurosurgery on call resident via smart web at 0314        Addendum: MD called back at 0318 and put in straight catheter orders.   "

## 2022-01-04 NOTE — ANESTHESIA PREPROCEDURE EVALUATION
Anesthesia Pre-Procedure Evaluation    Patient: Tonia Meeks   MRN: [id number] : 1934        Preoperative Diagnosis: Trigeminal neuralgia [G50.0]    Procedure : Procedure(s):  ANESTHESIA OUT OF OR Right Balloon Rhizotomy @1230          Past Medical History:   Diagnosis Date     CAD (coronary artery disease)      Fracture of right wrist 2016    from a fall     Fracture, ribs 2016    from a fall     Hyperlipidemia      Hypertension      Macular degeneration, bilateral      MI (myocardial infarction) (H)      Thoracic spine fracture (H) 2016     Trigeminal neuralgia       Past Surgical History:   Procedure Laterality Date     APPENDECTOMY       BALLOON COMPRESSION RHIZOTOMY Right 2015    Procedure: BALLOON COMPRESSION RHIZOTOMY;  Surgeon: Nahid Salas MD;  Location: [facility] OR     CARDIAC SURGERY      stent     ENT SURGERY      tonsillectomy     EYE SURGERY      cataracts     GYN SURGERY      hysterectomy     HC ECP WITH CATARACT SURGERY Bilateral      IR RHIZOTOMY  2020     STENT       thoracic spine fusion with instrumentation  2016    due to a fall      Allergies   Allergen Reactions     Sulfa Drugs Unknown      Social History     Tobacco Use     Smoking status: Never Smoker     Smokeless tobacco: Never Used   Substance Use Topics     Alcohol use: Not Currently     Alcohol/week: 0.0 standard drinks     Comment: couple of drinks/week      Wt Readings from Last 1 Encounters:   22 53.5 kg (118 lb)        Anesthesia Evaluation   Pt has had prior anesthetic. Type: General and MAC.    No history of anesthetic complications       ROS/MED HX  ENT/Pulmonary:  - neg pulmonary ROS     Neurologic: Comment: Trigeminal neuralgia      Cardiovascular:     (+) Dyslipidemia hypertension--CAD --- (-) taking anticoagulants/antiplatelets, CHF, ISAACS, orthopnea/PND, arrhythmias, irregular heartbeat/palpitations, valvular problems/murmurs and murmur   METS/Exercise Tolerance: 1 - Eating,  dressing    Hematologic:  - neg hematologic  ROS     Musculoskeletal: Comment: Hx of T-spine surgery - neg musculoskeletal ROS     GI/Hepatic: Comment: Had emesis yesterday after narcotics      Renal/Genitourinary:  - neg Renal ROS     Endo:  - neg endo ROS     Psychiatric/Substance Use:  - neg psychiatric ROS     Infectious Disease:  - neg infectious disease ROS     Malignancy:  - neg malignancy ROS     Other:            Physical Exam    Airway        Mallampati: II   TM distance: > 3 FB   Neck ROM: full   Mouth opening: > 3 cm    Respiratory Devices and Support         Dental  no notable dental history         Cardiovascular   cardiovascular exam normal       Rhythm and rate: regular and normal (-) no murmur    Pulmonary           breath sounds clear to auscultation           OUTSIDE LABS:  CBC:   Lab Results   Component Value Date    WBC 7.6 01/04/2022    WBC 7.7 01/03/2022    HGB 11.9 01/04/2022    HGB 13.3 01/03/2022    HCT 36.3 01/04/2022    HCT 39.7 01/03/2022     01/04/2022     01/03/2022     BMP:   Lab Results   Component Value Date     01/04/2022     01/03/2022    POTASSIUM 3.5 01/04/2022    POTASSIUM 4.4 01/03/2022    CHLORIDE 108 01/04/2022    CHLORIDE 105 01/03/2022    CO2 23 01/04/2022    CO2 23 01/03/2022    BUN 10 01/04/2022    BUN 17 01/03/2022    CR 0.62 01/04/2022    CR 0.69 01/03/2022     (H) 01/04/2022     (H) 01/03/2022     COAGS:   Lab Results   Component Value Date    PTT 28 01/04/2022    INR 1.11 01/04/2022     POC:   Lab Results   Component Value Date    BGM 92 01/30/2020     HEPATIC:   Lab Results   Component Value Date    ALBUMIN 3.7 09/12/2019    PROTTOTAL 6.9 09/12/2019    ALT 21 09/12/2019    AST 16 09/12/2019    ALKPHOS 97 09/12/2019    BILITOTAL 0.3 09/12/2019     OTHER:   Lab Results   Component Value Date    NALDO 8.0 (L) 01/04/2022       Anesthesia Plan    ASA Status:  3   NPO Status:  NPO Appropriate    Anesthesia Type: General.     - Airway:  ETT   Induction: Intravenous.   Maintenance: Inhalation.        Consents    Anesthesia Plan(s) and associated risks, benefits, and realistic alternatives discussed. Questions answered and patient/representative(s) expressed understanding.    - Discussed:     - Discussed with:  Patient      - Extended Intubation/Ventilatory Support Discussed: No.      - Patient is DNR/DNI Status: No    Use of blood products discussed: No .     Postoperative Care    Pain management: IV analgesics, Oral pain medications.   PONV prophylaxis: Ondansetron (or other 5HT-3), Dexamethasone or Solumedrol     Comments:    Other Comments: Patient seen and examined.  Risks, benefits and alternatives to GETA discussed.  Questions answered and patient wishes to proceed            Atif Mcneil MD

## 2022-01-04 NOTE — PROGRESS NOTES
Neurosurgery Attending Note:    Tonia presented to the ER with recurrent right trigeminal neuralgia involving V1 and V2.  She has not tolerated medical therapy well and would like a repeat balloon rhizotomy.  At this point she does not have much numbness of her face and has typical trigeminal pain.  With this in mind I feel a repeat balloon rhizotomy is appropriate.    We have discussed the procedure with Tonia including the risks and benefits including stroke, injury to the carotid artery, failure to treat her pain and anesthesia delerosa.  She understands these risks and wishes to proceed with the procedure.    Plan is a right sided balloon rhizotomy.

## 2022-01-04 NOTE — IR NOTE
Pt. Escorted to IR rm 3 by anesthesia team. Positioned supine, induction / intubation. Prepped. Time out with Dr. Conklin for right balloon rhizotomy. To PACU post procedure.

## 2022-01-04 NOTE — PLAN OF CARE
Status: Admitted for trigeminal neuralgia, scheduled for rhizotomy today   Vitals: VSS  Neuros: A/Ox4, forgetful. Decreased vision d/t macular degeneration, [place]  IV: PIV w/ NS @ 50 mL/hr  Resp/trach: RA  Diet: NPO (preprocedure)  Bowel status: no BM overnight   : incontinence, voids small amounts with urgency. At 0240, bladder scanned for PVR of >699, got 650mL out via st cath   Skin: No new deficits  Pain: 8-10/10 frontal headache, unrelieved by pain medications. Sleeping between cares  Activity: Ax 1-2, pivot to commode   Plan: rhizotomy today, then possible discharge per MDs

## 2022-01-04 NOTE — UTILIZATION REVIEW
Admission Status; Secondary Review Determination         Under the authority of the Utilization Management Committee, the utilization review process indicated a secondary review on the above patient.  The review outcome is based on review of the medical records, discussions with staff, and applying clinical experience noted on the date of the review.        (x)      Inpatient Status Appropriate - This patient's medical care is consistent with medical management for inpatient care and reasonable inpatient medical practice.      RATIONALE FOR DETERMINATION   The patient is an 87-year-old female who was admitted on 1/3/2022. She came to the emergency room because of nausea and vomiting with facial pain consistent with previous episodes of trigeminal neuralgia. She was treated with pain medication. She was restarted on carbamazepine. She was eventually scheduled for a rhizotomy to be done by Dr. Cipriano Conklin today. She has been unable to tolerate food brushing her teeth or pressure anywhere on her face. She was IV loaded with fosphenytoin. Based on inability to take orals and need for procedure remedy for recurrent trigeminal neuralgia, the patient is appropriate for inpatient status.      The severity of illness, intensity of service provided, expected LOS and risk for adverse outcome make the care complex, high risk and appropriate for hospital admission.        The information on this document is developed by the utilization review team in order for the business office to ensure compliance.  This only denotes the appropriateness of proper admission status and does not reflect the quality of care rendered.         The definitions of Inpatient Status and Observation Status used in making the determination above are those provided in the CMS Coverage Manual, Chapter 1 and Chapter 6, section 70.4.      Sincerely,     Tim Collazo MD  Physician Advisor  Utilization Review/ Case Management  [facility]  Services.

## 2022-01-04 NOTE — PROGRESS NOTES
[facility], [place]   Neurosurgery Progress Note:    Assessment: Tonia Meeks is a 87 year old female with history of trigeminal neuralgia, CAD, STEMI (2016), hypertension, hyperlipidemia, and bilateral macular degeneration who presents with exacerbation of right facial pain in V1, V2 distribution.      Clinically Significant Risk Factors Present on Admission                 Coronary artery disease   STEMI 2016  HTN           Plan:  - NPO  - Plan for balloon rhizotomy today with Dr. Cipriano Haskins, APRN, CNP  1/4/2022  Department of Neurosurgery  Pager: [phone]      Interval History:  Continues to note severe right sided facial pain, did not note improvement of pain following Fosphenytoin infusion yesterday.            Objective:   Temp:  [96.8  F (36  C)-98.2  F (36.8  C)] 96.8  F (36  C)  Pulse:  [] 81  Resp:  [16-18] 18  BP: (134-180)/(52-89) 134/53  SpO2:  [94 %-100 %] 94 %  No intake/output data recorded.    Gen: Appears comfortable, NAD  Wound: Incision, clean, dry, intact without strikethrough  Neurologic:  - Alert & Oriented to person, place, time, and situation  - Follows commands briskly  - Speech fluent, spontaneous. No aphasia or dysarthria.  - No gaze preference. No apparent hemineglect.  - PERRL, EOMI  - Strong eye closure, jaw clench, and cheek puff  - Face symmetric with sensation intact to light touch  - Palate elevates symmetrically, uvula midline, tongue protrudes midline  - Trapezii and sternocleidomastoid muscles 5/5 bilaterally  - No pronator drift     Del Tr Bi WE WF Gr   R 5 5 5 5 5 5   L 5 5 5 5 5 5    HF KE KF DF PF EHL   R 5 5 5 5 5 5   L 5 5 5 5 5 5     Reflexes 2+ throughout    Sensation intact and symmetric to light touch throughout    LABS  Recent Labs   Lab Test 01/03/22  1252 09/12/19  1504 06/15/17  1058   WBC 7.7 5.6 4.5   HGB 13.3 13.7 13.6   MCV 87 91 90    172 176       Recent Labs   Lab Test 01/03/22  1252  01/30/20  0741 09/12/19  1504 06/15/17  1058     --  137 140   POTASSIUM 4.4 3.7 4.0 4.2   CHLORIDE 105  --  104 105   CO2 23  --  29 28   BUN 17  --  13 12   CR 0.69 0.80 0.89 0.86   ANIONGAP 7  --  4 7   NALDO 8.8  --  9.0 8.8   *  --  105* 88       IMAGING    No results found for this or any previous visit (from the past 24 hour(s)).

## 2022-01-04 NOTE — PROGRESS NOTES
Arrived from:  ED  Belongings/meds:  Clothes.   2 RN Skin Assessment Completed by: Radha CANO and Zulma SPEAR   Non-intact findings documented (yes/no/NA): no.

## 2022-01-04 NOTE — PHARMACY-ADMISSION MEDICATION HISTORY
Admission Medication History Completed by Pharmacy    See [facility] Admission Navigator for allergy information, preferred outpatient pharmacy, prior to admission medications and immunization status.     Medication History Sources:     Patient Tonia Meeks    Dispense History    Patient's Pharmacy [facility]    Changes made to PTA medication list (reason):    Added: None    Deleted: None    Changed: None    Additional Information:    Patient claims she is taking lisinopril and atorvastatin, however both of these medications have not been filled since July of 2021. Her other medications have recent fill history. Based on this information it seems that the patient may not be consistently taking her atorvastatin and lisinopril.    Prior to Admission medications    Medication Sig Last Dose Taking? Auth Provider   carBAMazepine (TEGRETOL) 100 MG chewable tablet Take 1 tablet in the morning, take  1/2 tablet (50mg) at noon, and  1/2 tablet (50mg) at HS 1/2/2022 at Unknown time Yes Cipriano Conklin MD   METOPROLOL TARTRATE PO Take 25 mg by mouth daily ER  Yes Reported, Patient   acyclovir (ZOVIRAX) 5 % external cream Apply topically 5 times daily   Cipriano Conklin MD   ATORVASTATIN CALCIUM PO Take 40 mg by mouth daily    Reported, Patient   LISINOPRIL PO Take 10 mg by mouth daily    Reported, Patient       Date completed: 01/03/22    Medication history completed by: Kareem Morfin RPH

## 2022-01-05 VITALS
SYSTOLIC BLOOD PRESSURE: 160 MMHG | BODY MASS INDEX: 24.56 KG/M2 | RESPIRATION RATE: 16 BRPM | WEIGHT: 118 LBS | TEMPERATURE: 98 F | DIASTOLIC BLOOD PRESSURE: 74 MMHG | HEART RATE: 84 BPM | OXYGEN SATURATION: 94 %

## 2022-01-05 LAB
ANION GAP SERPL CALCULATED.3IONS-SCNC: 9 MMOL/L (ref 3–14)
BUN SERPL-MCNC: 10 MG/DL (ref 7–30)
CALCIUM SERPL-MCNC: 8.6 MG/DL (ref 8.5–10.1)
CHLORIDE BLD-SCNC: 108 MMOL/L (ref 94–109)
CO2 SERPL-SCNC: 23 MMOL/L (ref 20–32)
CREAT SERPL-MCNC: 0.79 MG/DL (ref 0.52–1.04)
ERYTHROCYTE [DISTWIDTH] IN BLOOD BY AUTOMATED COUNT: 12.9 % (ref 10–15)
GFR SERPL CREATININE-BSD FRML MDRD: 72 ML/MIN/1.73M2
GLUCOSE BLD-MCNC: 93 MG/DL (ref 70–99)
HCT VFR BLD AUTO: 38.7 % (ref 35–47)
HGB BLD-MCNC: 13 G/DL (ref 11.7–15.7)
MCH RBC QN AUTO: 29.6 PG (ref 26.5–33)
MCHC RBC AUTO-ENTMCNC: 33.6 G/DL (ref 31.5–36.5)
MCV RBC AUTO: 88 FL (ref 78–100)
PLATELET # BLD AUTO: 163 10E3/UL (ref 150–450)
POTASSIUM BLD-SCNC: 3.4 MMOL/L (ref 3.4–5.3)
RBC # BLD AUTO: 4.39 10E6/UL (ref 3.8–5.2)
SODIUM SERPL-SCNC: 140 MMOL/L (ref 133–144)
WBC # BLD AUTO: 6.4 10E3/UL (ref 4–11)

## 2022-01-05 PROCEDURE — 36415 COLL VENOUS BLD VENIPUNCTURE: CPT

## 2022-01-05 PROCEDURE — 250N000013 HC RX MED GY IP 250 OP 250 PS 637

## 2022-01-05 PROCEDURE — 85027 COMPLETE CBC AUTOMATED: CPT

## 2022-01-05 PROCEDURE — 80048 BASIC METABOLIC PNL TOTAL CA: CPT

## 2022-01-05 RX ORDER — LISINOPRIL 5 MG/1
5 TABLET ORAL ONCE
Status: COMPLETED | OUTPATIENT
Start: 2022-01-05 | End: 2022-01-05

## 2022-01-05 RX ADMIN — LISINOPRIL 5 MG: 5 TABLET ORAL at 10:09

## 2022-01-05 RX ADMIN — ATORVASTATIN CALCIUM 20 MG: 20 TABLET, FILM COATED ORAL at 08:25

## 2022-01-05 RX ADMIN — METOPROLOL SUCCINATE 25 MG: 25 TABLET, FILM COATED, EXTENDED RELEASE ORAL at 08:25

## 2022-01-05 RX ADMIN — LISINOPRIL 5 MG: 5 TABLET ORAL at 08:25

## 2022-01-05 RX ADMIN — CARBAMAZEPINE 100 MG: 100 TABLET, CHEWABLE ORAL at 08:25

## 2022-01-05 RX ADMIN — Medication 50 MG: at 11:16

## 2022-01-05 ASSESSMENT — ACTIVITIES OF DAILY LIVING (ADL)
ADLS_ACUITY_SCORE: 10

## 2022-01-05 NOTE — DISCHARGE SUMMARY
[facility] Discharge Summary and Instructions    Tonia Meeks MRN# [id number]   Age: 87 year old YOB: 1934     Date of Admission:  1/3/2022  Date of Discharge::  1/5/2022  Admitting Physician:  Cipriano Conklin MD  Discharge Physician:  Cipriano Conklin MD          Admission Diagnoses:   Trigeminal neuralgia [G50.0]          Discharge Diagnosis:     Trigeminal neuralgia [G50.0]     In addition to the assessment of diagnoses detailed above, this 87 year old female  patient admitted from home has the following conditions contributing to the complexity of their medical care:    Coronary artery disease           Procedures:   1. Right-sided balloon rhizotomy           Brief History of Illness:   Tonia is an 87-year-old woman who has a history of right sided trigeminal neuralgia.  She presented to ED on 1/3/22 in extremis because of her trigeminal neuralgia.  Recently, she has been having severe pain, which is intolerable at home, and we directed her to the Emergency Department.  Medication has not been beneficial.  We evaluated her and found that she had typical trigeminal neuralgia, and this was a recurrence.  Given the fact that she did not have significant numbness, we felt that a repeat balloon rhizotomy was appropriate.  We discussed the risks and benefits of this procedure including injury to the carotid artery, cranial nerve injury, bleeding in and around the brain, death and anesthesia dolorosa.  She understood the risks and benefits and wished to proceed.       Hospital Course:   Patient underwent above-mentioned procedure on 1/4/22. The operation was uncomplicated and she was admitted to the floor for routine post operative cares. She has some residual pain in the right V1 distribution, but endorses it is improved. She denies any pain in right V2 and V3 distributions. On post operative day 1, she was ambulating, voiding without a eugene, eating a regular diet, pain was well  controlled and therefore he/she was discharged home.    Exam on day of discharge:    Gen: Appears comfortable, no acute distress  Wound: Incision, clean, dry, intact   Neurologic:  - Alert & Oriented to person, place, time, and situation  - Speech fluent, spontaneous. No aphasia or dysarthria.  - No gaze preference. No apparent hemineglect.  - PERRL, EOMI  - Strong eye closure, jaw clench, and cheek puff  - Face symmetric with sensation intact to light touch  - Palate elevates symmetrically, uvula midline, tongue protrudes midline  - Trapezii and sternocleidomastoid muscles 5/5 bilaterally  - No pronator drift       Del Tr Bi WE WF Gr   R 5 5 5 5 5 5   L 5 5 5 5 5 5     HF KE KF DF PF EHL   R 5 5 5 5 5 5   L 5 5 5 5 5 5      Reflexes 2+ throughout     Sensation intact and symmetric to light touch throughout         Discharge Medications:     Current Discharge Medication List      CONTINUE these medications which have NOT CHANGED    Details   carBAMazepine (TEGRETOL) 100 MG chewable tablet Take 1 tablet in the morning, take  1/2 tablet (50mg) at noon, and  1/2 tablet (50mg) at HS  Qty: 45 tablet, Refills: 2    Associated Diagnoses: Trigeminal neuralgia      METOPROLOL TARTRATE PO Take 25 mg by mouth daily ER      acyclovir (ZOVIRAX) 5 % external cream Apply topically 5 times daily  Qty: 5 g, Refills: 3    Associated Diagnoses: Trigeminal neuralgia      ATORVASTATIN CALCIUM PO Take 40 mg by mouth daily       LISINOPRIL PO Take 10 mg by mouth daily                    Discharge Instructions and Follow-Up:     Discharge diet: Regular   Discharge activity: You may advance activity as tolerated. No strenuous exercise or heay lifting greater than 10 lbs for 4 weeks or until seen and cleared in clinic.   Discharge follow-up: Follow-up Dr. Cipriano Conklin MD 1/10/22; all additional follow-up visits to be determined by Dr. Cipriano Conklin MD       Wound care: Ok to shower,however no scrubbing of the wound and no  soaking of the wound, meaning no bathtubs or swimming pools. Pat dry only. Leave wound open to air.  Patient to have wound checked 2 weeks following surgery.    Wound location: right cheek  Closure technique: none  Dressing needs: none  Post-op care at follow-up: Keep dry and clean     Please call if you have:  1. increased pain, redness, drainage, swelling at your incision  2. fevers > 101.5 F degrees  3. with any questions or concerns.  You may reach the Neurosurgery clinic at [phone] during regular work hours. ER at [phone].    and ask for the Neurosurgery Resident on call at [phone], during off hours or weekends.         Discharge Disposition:     Discharged to home        Sabrina Hernandez MD, PhD  Department of Neurosurgery

## 2022-01-05 NOTE — PLAN OF CARE
Discharge time/date: 1/5/2022  Walked or Wheelchair: Wheelchair   Reviewed AVS with patient: With Pt and son. Encouraged to attend follow up appointments.   Medication due times added to AVS in EPIC: Yes   Verbalized understanding of discharge with teachback: Yes   Medications retrieved from pharmacy: DERIK   Supplies sent home: DERIK   Belongings from security with patient: DERIK

## 2022-01-05 NOTE — PLAN OF CARE
Status: Pt POD #1 balloon rhizotomy for trigeminal flare-up  Vitals: Hypertensive within parameters, RA  Neuros: A+O x4 but forgetful, mild dementia. 5/5 throughout.  NAZANIN. R droop noted, procedural, MD aware  IV: PIV SL  Resp/trach: WNL  Diet: Regular   Bowel status: No BM overnight   : Voiding with urgency and at times some incontinence d/t this. PVR was 138 ml, voided multiple times   Skin: Bruising throughout. R cheek incision ALEXYS  Pain: Some R cheek/facial pain, controlled with scheduled tegretol   Activity: A1 and GB, refuses at times   Plan: Discharge today, possibly by 1100. Continue with POC

## 2022-01-05 NOTE — PLAN OF CARE
Status: Patient arrived from PACU at 1630 s/p right balloon rhizotomy   Vitals: VSS  Neuros: Oriented x4, forgetful, [place], generalized weakness, patient agitated upon arrival to unit stating I need to go home, I'm not staying here another night, requires re-direction at times  IV: PIV infusing NS at 75mL/hr  Labs/Electrolytes: WNL  Resp/trach: Lung sounds clear throughout  Diet: Advanced to regular diet  Bowel status: Last BM unknown, BS+  : Voiding spontaneously with retention, PVR this AM 370mL, patient voided 250mL at 1645, PVR 217mL, order to straight cath over 400mL  Skin: R cheek incision with band-aid, CDI  Pain: Patient c/o lower back pain relieved with hot packs  Activity: Up with A1  Social: Cooperative with care, son Elio at bedside, supportive  Plan: Discharge home tomorrow by 1100, continue to monitor and follow POC

## 2022-01-06 ENCOUNTER — PATIENT OUTREACH (OUTPATIENT)
Dept: CARE COORDINATION | Facility: CLINIC | Age: 87
End: 2022-01-06
Payer: COMMERCIAL

## 2022-01-06 DIAGNOSIS — Z71.89 OTHER SPECIFIED COUNSELING: ICD-10-CM

## 2022-01-06 NOTE — PROGRESS NOTES
Clinic Care Coordination Contact  [facility]/Voicemail       Clinical Data: Care Coordinator Outreach  Outreach attempted x 1.  Left message on patient's voicemail with call back information and requested return call.  Plan: Care Coordinator will try to reach patient again in 1-2 business days.    .Karla HUDDLESTON Community Health Worker  Clinic Care Coordination  [facility]  Phone: [phone]

## 2022-01-07 ENCOUNTER — TELEPHONE (OUTPATIENT)
Dept: NEUROSURGERY | Facility: CLINIC | Age: 87
End: 2022-01-07
Payer: COMMERCIAL

## 2022-01-07 NOTE — PROGRESS NOTES
Clinic Care Coordination Contact  [facility]/Voicemail       Clinical Data: Care Coordinator Outreach  Outreach attempted x 2.  Left message on patient's voicemail with call back information and requested return call.  Plan:Care Coordinator will try to reach patient again in 1-2 business days.    Hyacinth Pierce, [facility]  [phone]  [facility]

## 2022-01-07 NOTE — TELEPHONE ENCOUNTER
[facility] Call Center    Phone Message    May a detailed message be left on voicemail: yes     Reason for Call: Other: Pt son calling in to inform pt had severe head pain last night, she got up in the middle of the nigth fell and hit her head in the kitchen and was rushed to the [place] emergency room.      Action Taken: Message routed to:  [facility]surgery     Travel Screening: Not Applicable

## 2022-01-10 NOTE — TELEPHONE ENCOUNTER
Spoke with Elio,  States Tonia is doing better, facial pain under control and will be discharging home today from [facility].     Explained patient should be taking Carbazepine 100mg in the morning , 50mg at noon and 50mg at night.  No other pain medication for facial pain.  Monitor patient for confusion, gait issues, or wobbliness.  No other pain medications will help the facial Trigeminal pain.    If she has back pain, please check in with PCP.      Elio will callback with update in the next week or two.    Voices understanding, has my name and number if needed.

## 2022-01-10 NOTE — TELEPHONE ENCOUNTER
Spoke with Elio, Tonia remains in [facility] after a fall, see NOTES documentation.  Elio states patient remains on Carbazepine 100mg in the morning 50 mg at noon and 50mg at HS.    Elio states patient continued to have facial pains.  Elio will get update today from [facility] for plan and will callback with update on facial pain and where/when discharge is set for.    I need to talk with patient when someone is with her, she using hearing aides.  Facial pain needs to be triaged for type, time, etc.    Elio will be calling back today. Appointment was set up with Rubin prior to Rhizotomy completed on 1/4/22,  Would prefer to HOLD appointment until more facts are know.  Elio will be returning call.

## 2022-01-24 ENCOUNTER — DOCUMENTATION ONLY (OUTPATIENT)
Dept: OTHER | Facility: CLINIC | Age: 87
End: 2022-01-24
Payer: COMMERCIAL

## 2024-07-19 ENCOUNTER — TELEPHONE (OUTPATIENT)
Dept: NEUROSURGERY | Facility: CLINIC | Age: 89
End: 2024-07-19
Payer: COMMERCIAL

## 2024-07-19 NOTE — TELEPHONE ENCOUNTER
[facility] Call Center    Phone Message    May a detailed message be left on voicemail: yes     Reason for Call: Other: Patient daughter Veronica called stating her mother has been in a lot of pain. She has been crying due to she is in pain. Writer went to schedule an appointment with provider but Dr. Conklin is scheduled out till October, and the patient can't wait that long. When calling back if you are calling to schedule you will need to call her daughter Veronica but if you want to talk to Tonia about her pain then you can call her. Please review and call back     Action Taken: Message routed to:  [facility] Neurosurgery     Travel Screening: Not Applicable     Date of Service:

## 2024-07-22 NOTE — TELEPHONE ENCOUNTER
[facility] Call Center    Phone Message    May a detailed message be left on voicemail: yes     Reason for Call: Other: Daughter, Veronica calling wanting to give siblings numbers so they do not miss the call from Dr. Conklin.  Veronica, Daughter [phone], Elio, Brother [phone], DaughterJacqueline [phone].  They ask to not call the patient.          Action Taken: Message routed to:  [facility] Neurosurgery     Travel Screening: Not Applicable     Date of Service:

## 2024-07-23 NOTE — TELEPHONE ENCOUNTER
"Spoke with daughter Veronica, Veronica states taking Gabapentin  taking 2 tablets at night \"for sleep\".  Now patient is stating taking one tablet in the morning and 2 tablets in the evening.  Unknown dosage and unknown if that is the correct medication.  Veronica will check medicine bottles for exact name and strength and how often patient is taking medication.  Patient lives in [facility] but can go to daughter home for Telephone OV with Dilia Sequeira NP.  Sibling Jacqueline .    Veronica states patient is having crying spells due to right sided facial pain.  H/O Rhizotomy procedures, last procedure with Dr Conklin appears 1/4/22.  Unknown amount of numbness at this time on the right side.              "

## 2024-07-23 NOTE — TELEPHONE ENCOUNTER
Spoke with TAVON Phillips with Mother in [place] at her home Monday 7/29 @ 9am, note sent to scheduling to call Jacqueline  [phone]  for appointment

## 2024-07-29 ENCOUNTER — VIRTUAL VISIT (OUTPATIENT)
Dept: NEUROSURGERY | Facility: CLINIC | Age: 89
End: 2024-07-29
Payer: COMMERCIAL

## 2024-07-29 DIAGNOSIS — G50.0 TRIGEMINAL NEURALGIA: Primary | ICD-10-CM

## 2024-07-29 PROCEDURE — 99443 PR PHYSICIAN TELEPHONE EVALUATION 21-30 MIN: CPT | Mod: 93 | Performed by: NURSE PRACTITIONER

## 2024-07-29 RX ORDER — GABAPENTIN 100 MG/1
200 CAPSULE ORAL AT BEDTIME
COMMUNITY
Start: 2024-05-29

## 2024-07-29 RX ORDER — OXCARBAZEPINE 150 MG/1
TABLET, FILM COATED ORAL
Qty: 120 TABLET | Refills: 2 | Status: SHIPPED | OUTPATIENT
Start: 2024-07-29

## 2024-07-29 NOTE — LETTER
"2024       RE: Tonia Meeks  [address]  [place] [address]       Dear Colleague,    Thank you for referring your patient, Tonia Meeks, to the [facility] NEUROSURGERY [facility]IC [place] at [facility]. Please see a copy of my visit note below.    [facility]  Department of Neurosurgery      Name: Tonia Meeks  MRN: [id number]  Age: 89 year old  : 1934  Referring provider: Dilia Sequeira  2024      Chief Complaint:   Right trigeminal neuralgia  New patient      History of Present Illness:   Tonia Meeks is a 89 year old female with a history of hypertension, hyperlipidemia who is here today for recurrent symptoms of right-sided trigeminal neuralgia.  Previously seen by Dr. Conklin in , but she is new to me.    Initial onset of symptoms in .  Previously underwent right-sided balloon rhizotomy by Dr. Salas on 2015.  She had recurrence of symptoms in  and was managed on medications for few years.  Due to medically refractory symptoms, she underwent repeat right-sided balloon rhizotomy in 2020 by Dr. Conklin.  Her symptoms were under good control until a month ago when she underwent a dental cleaning.  Her right-sided trigeminal neuralgia symptoms restarted after the dental cleaning.    Today patient reports \"bad pain, like knives sticking on my face\" in her right forehead and cheek areas.  Symptoms triggered by eating, touching her nose and sneezing.  Episodes are frequent.     She denies any residual numbness on her face from prior ablations.    She is not on any aspirin or anticoagulations.  She denies any known personal or family history of MS.      She is currently on gabapentin 100-100-200.  This was initially prescribed for trigeminal neuralgia.  Previously treated with carbamazepine.  This was stopped after her prior ablation.    Patient lives in a senior living facility. "  She is able to ambulate independently.    Review of Systems:   Pertinent items are noted in HPI or as in patient entered ROS below, remainder of complete ROS is negative.        No data to display                 Active Medications:     Current Outpatient Medications:     ATORVASTATIN CALCIUM PO, Take 40 mg by mouth daily , Disp: , Rfl:     gabapentin (NEURONTIN) 100 MG capsule, Take 100 mg by mouth 4 times daily, Disp: , Rfl:     LISINOPRIL PO, Take 10 mg by mouth daily , Disp: , Rfl:     METOPROLOL TARTRATE PO, Take 25 mg by mouth daily ER, Disp: , Rfl:     acyclovir (ZOVIRAX) 5 % external cream, Apply topically 5 times daily (Patient not taking: Reported on 7/29/2024), Disp: 5 g, Rfl: 3    carBAMazepine (TEGRETOL) 100 MG chewable tablet, Take 1 tablet in the morning, take  1/2 tablet (50mg) at noon, and  1/2 tablet (50mg) at HS (Patient not taking: Reported on 7/29/2024), Disp: 45 tablet, Rfl: 2      Allergies:   Sulfa antibiotics      Past Medical History:  Past Medical History:   Diagnosis Date    CAD (coronary artery disease)     Fracture of right wrist 09/2016    from a fall    Fracture, ribs 09/2016    from a fall    Hyperlipidemia     Hypertension     Macular degeneration, bilateral     MI (myocardial infarction) (H)     Thoracic spine fracture (H) 09/2016    Trigeminal neuralgia      Patient Active Problem List   Diagnosis    Trigeminal neuralgia        Past Surgical History:  Past Surgical History:   Procedure Laterality Date    APPENDECTOMY      BALLOON COMPRESSION RHIZOTOMY Right 12/17/2015    Procedure: BALLOON COMPRESSION RHIZOTOMY;  Surgeon: Nahid Salas MD;  Location: [facility] OR    CARDIAC SURGERY      stent    ENT SURGERY      tonsillectomy    EYE SURGERY      cataracts    GYN SURGERY      hysterectomy    [facility] ECP WITH CATARACT SURGERY Bilateral     IR RHIZOTOMY  1/30/2020    IR RHIZOTOMY  1/4/2022    STENT      thoracic spine fusion with instrumentation  09/2016    due to a fall       Family  History:   No family history on file.      Social History:   Social History     Tobacco Use    Smoking status: Never    Smokeless tobacco: Never   Substance Use Topics    Alcohol use: Not Currently     Alcohol/week: 0.0 standard drinks of alcohol     Comment: couple of drinks/week    Drug use: No        Imaging:  No recent imaging.     Assessment:  Right trigeminal neuralgia  New patient    Plan:  89-year-old female with hypertension and hyperlipidemia presenting with right-sided trigeminal neuralgia since 2010.  She previously underwent 2 balloon rhizotomies in 2015 and most recently in January 2020.    Today we discussed a trial of oxcarbazepine.  Patient and her daughter is concerned with combining gabapentin and oxcarbazepine due to increased risk of side effects.  We will start her on oxcarbazepine 150 mg twice daily.  She can reduce gabapentin to 100 mg at bedtime only as she has been using it for sleep.  If oxcarbazepine is effective for her facial pain, she can increase this up to 300 mg twice daily in 1 week.  We will also complete some baseline blood work.  She would like to complete the blood work at [facility] and we will fax the lab orders to this clinic.    Will review case with Dr. Conklin and will contact the patient with follow-up recommendations.      I spent 45 minutes on patient care activities related to this encounter on the date of service, including time spent reviewing the chart, obtaining history and examination and in counseling the patient, and in documentation in the electronic medical record.        Again, thank you for allowing me to participate in the care of your patient.      Sincerely,    KENYA Mahoney CNP

## 2024-07-29 NOTE — PATIENT INSTRUCTIONS
Labs in 1 week.    Reduce gabapentin to 100 mg at bedtime only.    Start oxcarbazepine at 150 mg twice daily.  If no side effects, increase to 300 mg twice daily.

## 2024-07-29 NOTE — NURSING NOTE
Current patient location: [address]   [place] [address]    Is the patient currently in the state of [place]? YES    Visit mode:TELEPHONE    If the visit is dropped, the patient can be reconnected by: TELEPHONE VISIT: Phone number:   No relevant phone numbers on file.       Will anyone else be joining the visit? NO  (If patient encounters technical issues they should call [phone]    How would you like to obtain your AVS? MyChart    Are changes needed to the allergy or medication list? Pt stated no med changes    Are refills needed on medications prescribed by this physician? NO    Reason for visit: Telephone (Right sided facial pain )    Sandrine ORTIZ

## 2024-07-29 NOTE — PROGRESS NOTES
"Virtual Visit Details    Type of service:  Telephone Visit   Phone call duration: 45 minutes   Originating Location (pt. Location): Home    Distant Location (provider location):  Off-site    [facility]  Department of Neurosurgery      Name: Tonia Meeks  MRN: [id number]  Age: 89 year old  : 1934  Referring provider: Dilia Sequeira  2024      Chief Complaint:   Right trigeminal neuralgia  New patient      History of Present Illness:   Tonia Meeks is a 89 year old female with a history of hypertension, hyperlipidemia who is here today for recurrent symptoms of right-sided trigeminal neuralgia.  Previously seen by Dr. Conklin in , but she is new to me.    Initial onset of symptoms in .  Previously underwent right-sided balloon rhizotomy by Dr. Salas on 2015.  She had recurrence of symptoms in  and was managed on medications for few years.  Due to medically refractory symptoms, she underwent repeat right-sided balloon rhizotomy in 2020 by Dr. Conklin.  Her symptoms were under good control until a month ago when she underwent a dental cleaning.  Her right-sided trigeminal neuralgia symptoms restarted after the dental cleaning.    Today patient reports \"bad pain, like knives sticking on my face\" in her right forehead and cheek areas.  Symptoms triggered by eating, touching her nose and sneezing.  Episodes are frequent.     She denies any residual numbness on her face from prior ablations.    She is not on any aspirin or anticoagulations.  She denies any known personal or family history of MS.      She is currently on gabapentin 100-100-200.  This was initially prescribed for trigeminal neuralgia.  Previously treated with carbamazepine.  This was stopped after her prior ablation.    Patient lives in a senior living facility.  She is able to ambulate independently.    Review of Systems:   Pertinent items are noted in HPI or as in patient entered ROS below, " remainder of complete ROS is negative.        No data to display                 Active Medications:     Current Outpatient Medications:     ATORVASTATIN CALCIUM PO, Take 40 mg by mouth daily , Disp: , Rfl:     gabapentin (NEURONTIN) 100 MG capsule, Take 100 mg by mouth 4 times daily, Disp: , Rfl:     LISINOPRIL PO, Take 10 mg by mouth daily , Disp: , Rfl:     METOPROLOL TARTRATE PO, Take 25 mg by mouth daily ER, Disp: , Rfl:     acyclovir (ZOVIRAX) 5 % external cream, Apply topically 5 times daily (Patient not taking: Reported on 7/29/2024), Disp: 5 g, Rfl: 3    carBAMazepine (TEGRETOL) 100 MG chewable tablet, Take 1 tablet in the morning, take  1/2 tablet (50mg) at noon, and  1/2 tablet (50mg) at HS (Patient not taking: Reported on 7/29/2024), Disp: 45 tablet, Rfl: 2      Allergies:   Sulfa antibiotics      Past Medical History:  Past Medical History:   Diagnosis Date    CAD (coronary artery disease)     Fracture of right wrist 09/2016    from a fall    Fracture, ribs 09/2016    from a fall    Hyperlipidemia     Hypertension     Macular degeneration, bilateral     MI (myocardial infarction) (H)     Thoracic spine fracture (H) 09/2016    Trigeminal neuralgia      Patient Active Problem List   Diagnosis    Trigeminal neuralgia        Past Surgical History:  Past Surgical History:   Procedure Laterality Date    APPENDECTOMY      BALLOON COMPRESSION RHIZOTOMY Right 12/17/2015    Procedure: BALLOON COMPRESSION RHIZOTOMY;  Surgeon: Nahid Salas MD;  Location: [facility] OR    CARDIAC SURGERY      stent    ENT SURGERY      tonsillectomy    EYE SURGERY      cataracts    GYN SURGERY      hysterectomy    [facility] ECP WITH CATARACT SURGERY Bilateral     IR RHIZOTOMY  1/30/2020    IR RHIZOTOMY  1/4/2022    STENT      thoracic spine fusion with instrumentation  09/2016    due to a fall       Family History:   No family history on file.      Social History:   Social History     Tobacco Use    Smoking status: Never    Smokeless  tobacco: Never   Substance Use Topics    Alcohol use: Not Currently     Alcohol/week: 0.0 standard drinks of alcohol     Comment: couple of drinks/week    Drug use: No        Imaging:  No recent imaging.     Assessment:  Right trigeminal neuralgia  New patient    Plan:  89-year-old female with hypertension and hyperlipidemia presenting with right-sided trigeminal neuralgia since 2010.  She previously underwent 2 balloon rhizotomies in 2015 and most recently in January 2020.    Today we discussed a trial of oxcarbazepine.  Patient and her daughter is concerned with combining gabapentin and oxcarbazepine due to increased risk of side effects.  We will start her on oxcarbazepine 150 mg twice daily.  She can reduce gabapentin to 100 mg at bedtime only as she has been using it for sleep.  If oxcarbazepine is effective for her facial pain, she can increase this up to 300 mg twice daily in 1 week.  We will also complete some baseline blood work.  She would like to complete the blood work at [facility] and we will fax the lab orders to this clinic.    Will review case with Dr. Conklin and will contact the patient with follow-up recommendations.    Addendum on 7/31/2024: Discussed with Dr. Conklin who recommended to proceed with Right Balloon rhizotomy. Patient will be contacted by our team member with schedule information.        Dilia Sequeira, CNP  Department of Neurosurgery    I spent 45 minutes on patient care activities related to this encounter on the date of service, including time spent reviewing the chart, obtaining history and examination and in counseling the patient, and in documentation in the electronic medical record.

## 2024-07-31 ENCOUNTER — TELEPHONE (OUTPATIENT)
Dept: NEUROSURGERY | Facility: CLINIC | Age: 89
End: 2024-07-31
Payer: COMMERCIAL

## 2024-07-31 DIAGNOSIS — G50.0 TRIGEMINAL NEURALGIA: Primary | ICD-10-CM

## 2024-07-31 NOTE — TELEPHONE ENCOUNTER
Orders Entered for   Right Sided Balloon rhizotomy under General Anesthesia  Stealth CT Head without contrast, NO Fiducials  PAC    Note sent to scheduling

## 2024-07-31 NOTE — TELEPHONE ENCOUNTER
----- Message from Dilia Sequeira sent at 7/31/2024 11:01 AM CDT -----  Hi,    Please schedule the patient for Right Balloon Rhizotomy with Dr. Conklin.     Thanks,  Dilia

## 2024-08-06 ENCOUNTER — TELEPHONE (OUTPATIENT)
Dept: NEUROSURGERY | Facility: CLINIC | Age: 89
End: 2024-08-06
Payer: COMMERCIAL

## 2024-08-06 NOTE — TELEPHONE ENCOUNTER
I called patient in regards IR procedure with Dr. Conklin. I was unable to reach patient, but a voicemail and a call back number were left on patients voicemail.    Marquis Hernandez on 8/6/2024 at 2:02 PM

## 2024-08-07 ENCOUNTER — DOCUMENTATION ONLY (OUTPATIENT)
Dept: OTHER | Facility: CLINIC | Age: 89
End: 2024-08-07
Payer: COMMERCIAL

## 2024-08-12 NOTE — TELEPHONE ENCOUNTER
I called patient in regards IR procedure with Dr. Conklin. I was unable to reach patient, but a voicemail and a call back number were left on patients voicemail.    Marquis Hernandez on 8/12/2024 at 9:23 AM

## 2024-08-13 ENCOUNTER — TELEPHONE (OUTPATIENT)
Dept: NEUROSURGERY | Facility: CLINIC | Age: 89
End: 2024-08-13
Payer: COMMERCIAL

## 2024-08-13 NOTE — TELEPHONE ENCOUNTER
FUTURE VISIT INFORMATION      SURGERY INFORMATION:  Date: 24  [facility]  Surgeon:  Cipriano Conklin MD   Anesthesia Type:  general  Procedure: ANESTHESIA, IN NON-OPERATING ROOM SETTING FOR A RIGHT SIDED BALLOON RHIZOTOMY@0800   RECORDS REQUESTED FROM:       Primary Care Provider: Alysa Bennett MD  - [facility]    Most recent EKG+ Tracin22- [facility]

## 2024-08-13 NOTE — TELEPHONE ENCOUNTER
FUTURE VISIT INFORMATION        SURGERY INFORMATION:  Date: 24  [facility]  Surgeon:  Cipriano Conklin MD   Anesthesia Type:  general  Procedure: ANESTHESIA, IN NON-OPERATING ROOM SETTING FOR A RIGHT SIDED BALLOON RHIZOTOMY@0800   RECORDS REQUESTED FROM:         Primary Care Provider: Alysa Bennett MD  - [facility]     Most recent EKG+ Tracin22- [facility]

## 2024-08-13 NOTE — TELEPHONE ENCOUNTER
8/29/24 0800 AWG Right sided Balloon Rhizot  8/22 1230 PAC in preson  8/22 4:00 CT head in person  9/12 1030 Post op Video Dilia  8/13 surgery packet sent  Left message for patient and one of the daughters asking for q callback to review surgery information.

## 2024-08-13 NOTE — TELEPHONE ENCOUNTER
I called patient son Elio (patient gave verbal consent to contact) in regards to IR procedure with Dr. Conklin    Procedure Date: 8/29/2024    Arrival: 0630    Loction: [facility] IR    Pre op: PAC In Person    Post op: Telephone Visit    Anesthesia Type: General Anesthesia    Notes:     - Head CT scheduled on 8/22      Marquis Hernandez on 8/13/2024 at 11:27 AM

## 2024-08-22 ENCOUNTER — PRE VISIT (OUTPATIENT)
Dept: SURGERY | Facility: CLINIC | Age: 89
End: 2024-08-22

## 2024-08-22 ENCOUNTER — ANCILLARY PROCEDURE (OUTPATIENT)
Dept: CT IMAGING | Facility: CLINIC | Age: 89
End: 2024-08-22
Attending: NEUROLOGICAL SURGERY
Payer: COMMERCIAL

## 2024-08-22 ENCOUNTER — LAB (OUTPATIENT)
Dept: LAB | Facility: CLINIC | Age: 89
End: 2024-08-22
Payer: COMMERCIAL

## 2024-08-22 ENCOUNTER — OFFICE VISIT (OUTPATIENT)
Dept: SURGERY | Facility: CLINIC | Age: 89
End: 2024-08-22
Payer: COMMERCIAL

## 2024-08-22 ENCOUNTER — ANESTHESIA EVENT (OUTPATIENT)
Dept: SURGERY | Facility: CLINIC | Age: 89
End: 2024-08-22
Payer: COMMERCIAL

## 2024-08-22 VITALS
SYSTOLIC BLOOD PRESSURE: 153 MMHG | WEIGHT: 113.2 LBS | DIASTOLIC BLOOD PRESSURE: 89 MMHG | TEMPERATURE: 97.7 F | HEIGHT: 58 IN | OXYGEN SATURATION: 98 % | BODY MASS INDEX: 23.76 KG/M2 | HEART RATE: 100 BPM | RESPIRATION RATE: 16 BRPM

## 2024-08-22 DIAGNOSIS — G50.0 TRIGEMINAL NEURALGIA: ICD-10-CM

## 2024-08-22 DIAGNOSIS — G50.0 TRIGEMINAL NEURALGIA: Primary | ICD-10-CM

## 2024-08-22 DIAGNOSIS — Z01.818 PRE-OP EVALUATION: Primary | ICD-10-CM

## 2024-08-22 LAB
ALBUMIN SERPL BCG-MCNC: 4.1 G/DL (ref 3.5–5.2)
ALP SERPL-CCNC: 83 U/L (ref 40–150)
ALT SERPL W P-5'-P-CCNC: 10 U/L (ref 0–50)
ANION GAP SERPL CALCULATED.3IONS-SCNC: 10 MMOL/L (ref 7–15)
AST SERPL W P-5'-P-CCNC: 18 U/L (ref 0–45)
BASOPHILS # BLD AUTO: 0 10E3/UL (ref 0–0.2)
BASOPHILS NFR BLD AUTO: 1 %
BILIRUB SERPL-MCNC: 0.3 MG/DL
BUN SERPL-MCNC: 10.6 MG/DL (ref 8–23)
CALCIUM SERPL-MCNC: 9.5 MG/DL (ref 8.8–10.4)
CHLORIDE SERPL-SCNC: 106 MMOL/L (ref 98–107)
CREAT SERPL-MCNC: 0.9 MG/DL (ref 0.51–0.95)
EGFRCR SERPLBLD CKD-EPI 2021: 60 ML/MIN/1.73M2
EOSINOPHIL # BLD AUTO: 0.1 10E3/UL (ref 0–0.7)
EOSINOPHIL NFR BLD AUTO: 1 %
ERYTHROCYTE [DISTWIDTH] IN BLOOD BY AUTOMATED COUNT: 13.2 % (ref 10–15)
GLUCOSE SERPL-MCNC: 131 MG/DL (ref 70–99)
HCO3 SERPL-SCNC: 26 MMOL/L (ref 22–29)
HCT VFR BLD AUTO: 42.3 % (ref 35–47)
HGB BLD-MCNC: 13.7 G/DL (ref 11.7–15.7)
IMM GRANULOCYTES # BLD: 0 10E3/UL
IMM GRANULOCYTES NFR BLD: 0 %
LYMPHOCYTES # BLD AUTO: 1.7 10E3/UL (ref 0.8–5.3)
LYMPHOCYTES NFR BLD AUTO: 20 %
MCH RBC QN AUTO: 28.1 PG (ref 26.5–33)
MCHC RBC AUTO-ENTMCNC: 32.4 G/DL (ref 31.5–36.5)
MCV RBC AUTO: 87 FL (ref 78–100)
MONOCYTES # BLD AUTO: 0.8 10E3/UL (ref 0–1.3)
MONOCYTES NFR BLD AUTO: 9 %
NEUTROPHILS # BLD AUTO: 5.9 10E3/UL (ref 1.6–8.3)
NEUTROPHILS NFR BLD AUTO: 69 %
NRBC # BLD AUTO: 0 10E3/UL
NRBC BLD AUTO-RTO: 0 /100
PLATELET # BLD AUTO: 192 10E3/UL (ref 150–450)
POTASSIUM SERPL-SCNC: 3.8 MMOL/L (ref 3.4–5.3)
PROT SERPL-MCNC: 6.8 G/DL (ref 6.4–8.3)
RBC # BLD AUTO: 4.87 10E6/UL (ref 3.8–5.2)
SODIUM SERPL-SCNC: 142 MMOL/L (ref 135–145)
WBC # BLD AUTO: 8.5 10E3/UL (ref 4–11)

## 2024-08-22 PROCEDURE — 80053 COMPREHEN METABOLIC PANEL: CPT | Performed by: PATHOLOGY

## 2024-08-22 PROCEDURE — 85025 COMPLETE CBC W/AUTO DIFF WBC: CPT | Performed by: PATHOLOGY

## 2024-08-22 PROCEDURE — 99203 OFFICE O/P NEW LOW 30 MIN: CPT | Performed by: PHYSICIAN ASSISTANT

## 2024-08-22 PROCEDURE — 36415 COLL VENOUS BLD VENIPUNCTURE: CPT | Performed by: PATHOLOGY

## 2024-08-22 PROCEDURE — 70450 CT HEAD/BRAIN W/O DYE: CPT | Mod: GC | Performed by: RADIOLOGY

## 2024-08-22 ASSESSMENT — PAIN SCALES - GENERAL: PAINLEVEL: EXTREME PAIN (8)

## 2024-08-22 ASSESSMENT — LIFESTYLE VARIABLES: TOBACCO_USE: 0

## 2024-08-22 ASSESSMENT — ENCOUNTER SYMPTOMS: SEIZURES: 0

## 2024-08-22 NOTE — PATIENT INSTRUCTIONS
Preparing for Your Surgery      Name:  Tonia Meeks   MRN:  [id number]   :  1934   Today's Date:  2024       Arriving for surgery:  Surgery date:  24  Arrival time:  6:00 am  Surgery time: 8:00 am    Please come to:     Please come to:       [facility]    [address] [place]  [address]     The [facility] [place] Patient/Visitor Ramp is at [address]. Patients and visitors who self-park will receive the reduced hospital parking rate. If the Patient /Visitor Ramp is full, please follow the signs to the [employer] car park located at the main hospital entrance.       parking is available (24 hours/ 7 days a week)      Discounted parking pass options are available for patients and visitors. They can be purchased at the [employer] desk at the main hospital entrance.     -    Stop at the security desk and they will direct surgery patients to the [facility] [phone]        - If you need directions, a wheelchair or an escort please stop at the Information/security desk in the lobby.     What can I eat or drink?  -  You may eat and drink normally up to 8 hours prior to arrival time. (Until 10:00 pm on 24)  -  You may have clear liquids until 2 hours prior to arrival time. (Until 4:00 am on 24)    Examples of clear liquids:  Water  Clear broth  Juices (apple, white grape, white cranberry  and cider) without pulp  Noncarbonated, powder based beverages  (lemonade and Jeramy-Aid)  Sodas (Sprite, 7-Up, ginger ale and seltzer)  Coffee or tea (without milk or cream)  Gatorade    -  No Alcohol or cannabis products for at least 24 hours before surgery.     Which medicines can I take?    Hold Aspirin for 7 days before surgery.   Hold Multivitamins for 7 days before surgery.  Hold Supplements for 7 days before surgery.  Hold Ibuprofen (Advil, Motrin)  for 1 day(s) before surgery--unless otherwise directed by surgeon.  Hold Naproxen (Aleve) for 4 days before surgery.    -  DO NOT take these medications the day of surgery:  Lisinopril    -  PLEASE TAKE these medications per your usual routine:  Atorvastatin (Lipitor)  Gabapentin (Neurontin)  Metoprolol  Tegretol  Trileptal    How do I prepare myself?  - Please take 2 showers (one the night prior to surgery and one the morning of surgery) using Scrubcare or Hibiclens soap.    Use this soap only from the neck to your toes. Do not use in genital area.     Leave the soap on your skin for one minute--then rinse thoroughly.      You may use your own shampoo and conditioner. No other hair products.      Sleep in clean sheets and wear clean clothes.  - Please remove all jewelry and body piercings.  - No lotions, deodorants or fragrance.  - No makeup or fingernail polish.   - Bring your ID and insurance card.    -If you use a CPAP machine, please bring the CPAP machine, tubing, and mask to hospital.    -If you have a Deep Brain Stimulator, Spinal Cord Stimulator, or any Neuro Stimulator device---you must bring the remote control to the hospital.      ALL PATIENTS GOING HOME THE SAME DAY OF SURGERY ARE REQUIRED TO HAVE A RESPONSIBLE ADULT TO DRIVE AND BE IN ATTENDANCE WITH THEM FOR 24 HOURS FOLLOWING SURGERY.    Covid testing policy as of 12/06/2022  Your surgeon will notify and schedule you for a COVID test if one is needed before surgery--please direct any questions or COVID symptoms to your surgeon      Questions or Concerns:    - For any questions regarding the day of surgery or your hospital stay, please contact the Pre Admission Nursing Office at [phone].       - If you have health changes between today and your surgery, please call your surgeon.       - For questions after surgery, please call your surgeons office.           Current Visitor Guidelines    You may have 2 visitors in the pre op area.    Visiting  hours: 8 a.m. to 8:30 p.m.    Patients confirmed or suspected to have symptoms of COVID 19 or flu:     No visitors allowed for adult patients.   Children (under age 18) can have 1 named visitor.     People who are sick or showing symptoms of COVID 19 or flu:    Are not allowed to visit patients--we can only make exceptions in special situations.       Please follow these guidelines for your visit:          Please maintain social distance          Masking is optional--however at times you may be asked to wear a mask for the safety of yourself and others     Clean your hands with alcohol hand . Do this when you arrive at and leave the building and patient room,    And again after you touch your mask or anything in the room.     Go directly to and from the room you are visiting.     Stay in the patient s room during your visit. Limit going to other places in the hospital as much as possible     Leave bags and jackets at home or in the car.     For everyone s health, please don t come and go during your visit. That includes for smoking   during your visit.

## 2024-08-22 NOTE — H&P
Pre-Operative H & P     CC:  Preoperative exam to assess for increased cardiopulmonary risk while undergoing surgery and anesthesia.    Date of Encounter: 8/22/2024  Primary Care Physician:  Jeramy Francis     Reason for visit:   Encounter Diagnosis   Name Primary?    Pre-op evaluation Yes       HPI  Tonia Meeks is a 89 year old female who presents for pre-operative H & P in preparation for  Procedure Information       Case: [id number] Date/Time: 08/29/24 0800    Procedure: ANESTHESIA, IN NON-OPERATING ROOM SETTING FOR A RIGHT SIDED BALLOON RHIZOTOMY@0800 (Head)    Anesthesia type: General    Diagnosis: Trigeminal neuralgia [G50.0]    Pre-op diagnosis: Trigeminal neuralgia [G50.0]    Location: [facility] OUT OF OR  /  OR    Providers: GENERIC ANESTHESIA PROVIDER            Patient is being evaluated for comorbid conditions of hypertension, dyslipidemia, CAD    Ms. Meeks has a history of trigeminal neuralgia and is status post rhizotomy in 2010, right-sided rhizotomy in 2020.  Her symptoms are controlled about a month ago after dental cleaning she was seen by Jessi Sequeira and is now scheduled for the above procedure.    History is obtained from the patient and chart review. Patient's son is also present for this visit.     Hx of abnormal bleeding or anti-platelet use: denies    Menstrual history: No LMP recorded. Patient has had a hysterectomy.     Past Medical History  Past Medical History:   Diagnosis Date    CAD (coronary artery disease)     Fracture of right wrist 09/2016    from a fall    Fracture, ribs 09/2016    from a fall    Hyperlipidemia     Hypertension     Macular degeneration, bilateral     MI (myocardial infarction) (H)     Thoracic spine fracture (H) 09/2016    Trigeminal neuralgia        Past Surgical History  Past Surgical History:   Procedure Laterality Date    APPENDECTOMY      BALLOON COMPRESSION RHIZOTOMY Right 12/17/2015    Procedure: BALLOON COMPRESSION RHIZOTOMY;  Surgeon: Mairtza  Nahid Kumar MD;  Location: [facility] OR    CARDIAC SURGERY      stent    ENT SURGERY      tonsillectomy    EYE SURGERY      cataracts    GYN SURGERY      hysterectomy    HC ECP WITH CATARACT SURGERY Bilateral     IR RHIZOTOMY  1/30/2020    IR RHIZOTOMY  1/4/2022    STENT      thoracic spine fusion with instrumentation  09/2016    due to a fall       Prior to Admission Medications  Current Outpatient Medications   Medication Sig Dispense Refill    ATORVASTATIN CALCIUM PO Take 40 mg by mouth daily       gabapentin (NEURONTIN) 100 MG capsule Take 200 mg by mouth at bedtime.      LISINOPRIL PO Take 10 mg by mouth every morning.      METOPROLOL TARTRATE PO Take 25 mg by mouth every morning. ER      carBAMazepine (TEGRETOL) 100 MG chewable tablet Take 1 tablet in the morning, take  1/2 tablet (50mg) at noon, and  1/2 tablet (50mg) at HS (Patient not taking: Reported on 7/29/2024) 45 tablet 2    OXcarbazepine (TRILEPTAL) 150 MG tablet Week 1: Take oxcarbazepine 150 mg twice daily.  If no side effects, okay to increase to 300 mg twice daily after a week. (Patient not taking: Reported on 8/22/2024) 120 tablet 2       Allergies  Allergies   Allergen Reactions    Sulfa Antibiotics Unknown       Social History  Social History     Socioeconomic History    Marital status:      Spouse name: Not on file    Number of children: Not on file    Years of education: Not on file    Highest education level: Not on file   Occupational History    Not on file   Tobacco Use    Smoking status: Never    Smokeless tobacco: Never   Substance and Sexual Activity    Alcohol use: Yes     Comment: couple of drinks/week    Drug use: No    Sexual activity: Not on file   Other Topics Concern    Parent/sibling w/ CABG, MI or angioplasty before 65F 55M? Not Asked   Social History Narrative    Not on file     Social Determinants of Health     Financial Resource Strain: Not on file   Food Insecurity: Not on file   Transportation Needs: Not on file    Physical Activity: Not on file   Stress: Not on file   Social Connections: Not on file   Interpersonal Safety: Not on file   Housing Stability: Not on file       Family History  Family History   Problem Relation Age of Onset    Anesthesia Reaction No family hx of     Deep Vein Thrombosis (DVT) No family hx of        Review of Systems  The complete review of systems is negative other than noted in the HPI or here.   Anesthesia Evaluation   Pt has had prior anesthetic.     No history of anesthetic complications       ROS/MED HX  ENT/Pulmonary:  - neg pulmonary ROS  (-) tobacco use   Neurologic: Comment: Trigeminal neuralgia    (-) no seizures and no CVA   Cardiovascular: Comment: CTA 2022  CORONARY ARTERY CALCIFICATION: Moderate.    (+) Dyslipidemia hypertension- -  CAD -  - stent- 2016                                Previous cardiac testing   Echo: Date: 2022 Results:   1. Echo  1/7/2022 11:15:00 AM.     2. Contrast imaging agent used.     3. Normal sinus rhythm during study.     4. Normal LV chamber size.     5. Small area of thinning and akinesis of mid anteroseptal segment with   hypokinesis of basal anteroseptal segment.     6. Low normal LV systolic function, EF 50-55%.     7. There is borderline increased left ventricular wall thickness.     8. Grade 1 diastolic filling pattern (impaired relaxation with low to   normal   filling pressure).     9. Grossly normal RV size and function.     10. Left atrial chamber dimension is mildly enlarged.     11. There is trace aortic valve regurgitation.     12. There is mild tricuspid valve regurgitation.     13. The aortic root at the sinus of Valsalva is moderately dilated   measuring   4.21 cm with an index of 2.65 cm/m2.     14. The proximal ascending aorta is normal measuring 3.10 cm with an   index   of 1.95 cm/m2.     15. IVC diameter <2.1cm with >50% collapse upon inspiration consistent   with   normal right atrial pressure, 3 mmHg.     16. No gross pericardial  "effusion.     17. Compared to image review and report of study dated 11/18/2016, global   and regional LV function is similar/unchanged.  Compared to report of   studies   dated 9/12/2016 and 11/18/2016, aortic root at sinus of Valsalva is more   dilated on present study (measured 3.7 cm. on 9/12/2016 study).     Stress Test:  Date: Results:    ECG Reviewed:  Date: 2020 Results:  Sinus bradycardia   Cath:  Date: Results:   (-) taking anticoagulants/antiplatelets   METS/Exercise Tolerance: 3 - Able to walk 1-2 blocks without stopping Comment: Can walk a block without exertional symptoms    Hematologic:  - neg hematologic  ROS  (-) history of blood clots and history of blood transfusion   Musculoskeletal:  - neg musculoskeletal ROS     GI/Hepatic:  - neg GI/hepatic ROS  (-) GERD   Renal/Genitourinary:  - neg Renal ROS     Endo:  - neg endo ROS     Psychiatric/Substance Use:       Infectious Disease:  - neg infectious disease ROS     Malignancy:  - neg malignancy ROS     Other:            BP (!) 153/89 (BP Location: Right arm, Patient Position: Sitting, Cuff Size: Adult Regular)   Pulse 100   Temp 97.7  F (36.5  C) (Oral)   Resp 16   Ht 1.477 m (4' 10.13\")   Wt 51.3 kg (113 lb 3.2 oz)   SpO2 98%   BMI 23.55 kg/m      Physical Exam   Constitutional: Awake, alert, cooperative, no apparent distress, and appears stated age.  Eyes: Pupils equal, round and reactive to light, extra ocular muscles intact, sclera clear, conjunctiva normal.  HENT: Normocephalic, oral pharynx with moist mucus membranes, good dentition. No goiter appreciated.   Respiratory: Clear to auscultation bilaterally, no crackles or wheezing.  Cardiovascular: Regular rate and rhythm, normal S1 and S2, and no murmur noted.  Carotids  no bruits. No edema. Palpable pulses to radial arteries.   GI: Normal bowel sounds, soft, non-distended, non-tender  Genitourinary:  deferred  Skin: Warm and dry.  Musculoskeletal: limited ROM of neck. There is no " redness, warmth, or swelling of the exposed joints.   Neurologic: Awake, alert, oriented to name, place and time. Cranial nerves II-XII are grossly intact.   Neuropsychiatric: Calm, cooperative. Normal affect.     Prior Labs/Diagnostic Studies   All labs and imaging personally reviewed     EKG/ stress test - if available please see in ROS above   No results found.       No data to display                  The patient's records and results personally reviewed by this provider.     Outside records reviewed from: Care Everywhere    LAB/DIAGNOSTIC STUDIES TODAY:  CBC, BMP    Assessment    Tonia Meeks is a 89 year old female seen as a PAC referral for risk assessment and optimization for anesthesia.    Plan/Recommendations  Pt will be optimized for the proposed procedure.  See below for details on the assessment, risk, and preoperative recommendations    NEUROLOGY  - No history of TIA, CVA or seizure  -trigeminal neuralgia s/p balloon rhizotomy x2. Now with recurrent symptoms and the above procedure planned   -Post Op delirium risk factors:  Age    ENT  - No current airway concerns.  Will need to be reassessed day of surgery.  Mallampati: III  TM: > 3  -patient reports she lost a tooth a couple days ago and has a temporary bridge in place    CARDIAC  -hypertension using lisinopril  -CAD using metoprolol and statin. Anterior STEMI with ARA to pLAD with EF to 25-30% at the time.  Follows with cardiology. Has since has cardiac imaging (see above) and EF has improved to 50-55% and improved WMA.   -h/o postop a fib with RVR. No known recurrence since 2016.   -denies cardiac symptoms  - METS (Metabolic Equivalents)  Patient CANNOT perform 4 METS exercise without symptoms             Total Score: 1    Functional Capacity: Unable to complete 4 METS      RCRI-Very low risk: Class 1 0.4% complication rate             Total Score: 0        PULMONARY  SIMON Low Risk             Total Score: 2    SIMON: Hypertension    SIMON: Over 50  "ys old      - Denies asthma or inhaler use  - Tobacco History    History   Smoking Status    Never   Smokeless Tobacco    Never       GI  PONV High Risk  Total Score: 3           1 AN PONV: Pt is Female    1 AN PONV: Patient is not a current smoker    1 AN PONV: Intended Post Op Opioids        /RENAL  - Baseline Creatinine, will update today     ENDOCRINE    - BMI: Estimated body mass index is 23.55 kg/m  as calculated from the following:    Height as of this encounter: 1.477 m (4' 10.13\").    Weight as of this encounter: 51.3 kg (113 lb 3.2 oz).  Healthy Weight (BMI 18.5-24.9)  - No history of Diabetes Mellitus    HEME  VTE Low Risk 0.26%             Total Score: 1    VTE: Greater than 59 yrs old      - No history of abnormal bleeding or antiplatelet use.    MSK  Patient IS Frail             Total Score: 5    Frailty: Weight loss 10 lbs or greater    Frailty: Slower walking speed    Frailty: Decrease in strength    Frailty: Increased exhaustion    Frailty: Overall lack of energy      -patient declines PM&R referral     Different anesthesia methods/types have been discussed with the patient, but they are aware that the final plan will be decided by the assigned anesthesia provider on the date of service.  Patient was discussed with Dr. Cordova    The patient is optimized for their procedure. AVS with information on surgery time/arrival time, meds and NPO status given by nursing staff. No further diagnostic testing indicated.      On the day of service:     Prep time: 19 minutes  Visit time: 15 minutes  Documentation time: 8 minutes  ------------------------------------------  Total time: 42 minutes      Yessy Pineda PA-C  Preoperative Assessment Center  [facility]  Clinic and [facility] Center  Phone: [phone]  Fax: [phone]    "

## 2024-08-29 ENCOUNTER — APPOINTMENT (OUTPATIENT)
Dept: INTERVENTIONAL RADIOLOGY/VASCULAR | Facility: CLINIC | Age: 89
End: 2024-08-29
Attending: NEUROLOGICAL SURGERY
Payer: COMMERCIAL

## 2024-08-29 ENCOUNTER — ANESTHESIA (OUTPATIENT)
Dept: SURGERY | Facility: CLINIC | Age: 89
End: 2024-08-29
Payer: COMMERCIAL

## 2024-08-29 ENCOUNTER — HOSPITAL ENCOUNTER (OUTPATIENT)
Facility: CLINIC | Age: 89
Discharge: HOME | End: 2024-08-29
Attending: INTERNAL MEDICINE | Admitting: NEUROLOGICAL SURGERY
Payer: COMMERCIAL

## 2024-08-29 VITALS
OXYGEN SATURATION: 98 % | WEIGHT: 111.99 LBS | HEART RATE: 70 BPM | TEMPERATURE: 97.8 F | DIASTOLIC BLOOD PRESSURE: 83 MMHG | HEIGHT: 58 IN | SYSTOLIC BLOOD PRESSURE: 128 MMHG | RESPIRATION RATE: 16 BRPM | BODY MASS INDEX: 23.51 KG/M2

## 2024-08-29 DIAGNOSIS — G50.0 TRIGEMINAL NEURALGIA: ICD-10-CM

## 2024-08-29 LAB
ABO/RH(D): NORMAL
ANION GAP SERPL CALCULATED.3IONS-SCNC: 14 MMOL/L (ref 7–15)
ANTIBODY SCREEN: NEGATIVE
BUN SERPL-MCNC: 12.6 MG/DL (ref 8–23)
CALCIUM SERPL-MCNC: 9.2 MG/DL (ref 8.8–10.4)
CHLORIDE SERPL-SCNC: 101 MMOL/L (ref 98–107)
CREAT SERPL-MCNC: 0.9 MG/DL (ref 0.51–0.95)
EGFRCR SERPLBLD CKD-EPI 2021: 60 ML/MIN/1.73M2
ERYTHROCYTE [DISTWIDTH] IN BLOOD BY AUTOMATED COUNT: 12.9 % (ref 10–15)
GLUCOSE SERPL-MCNC: 98 MG/DL (ref 70–99)
HCO3 SERPL-SCNC: 24 MMOL/L (ref 22–29)
HCT VFR BLD AUTO: 42.8 % (ref 35–47)
HGB BLD-MCNC: 14.3 G/DL (ref 11.7–15.7)
INR PPP: 0.94 (ref 0.85–1.15)
MAGNESIUM SERPL-MCNC: 2 MG/DL (ref 1.7–2.3)
MCH RBC QN AUTO: 28.7 PG (ref 26.5–33)
MCHC RBC AUTO-ENTMCNC: 33.4 G/DL (ref 31.5–36.5)
MCV RBC AUTO: 86 FL (ref 78–100)
PHOSPHATE SERPL-MCNC: 3.2 MG/DL (ref 2.5–4.5)
PLATELET # BLD AUTO: 208 10E3/UL (ref 150–450)
POTASSIUM SERPL-SCNC: 4.2 MMOL/L (ref 3.4–5.3)
RBC # BLD AUTO: 4.98 10E6/UL (ref 3.8–5.2)
SODIUM SERPL-SCNC: 139 MMOL/L (ref 135–145)
SPECIMEN EXPIRATION DATE: NORMAL
WBC # BLD AUTO: 6.8 10E3/UL (ref 4–11)

## 2024-08-29 PROCEDURE — 250N000025 HC SEVOFLURANE, PER MIN

## 2024-08-29 PROCEDURE — 250N000013 HC RX MED GY IP 250 OP 250 PS 637: Performed by: ANESTHESIOLOGY

## 2024-08-29 PROCEDURE — 250N000009 HC RX 250: Performed by: NURSE ANESTHETIST, CERTIFIED REGISTERED

## 2024-08-29 PROCEDURE — 84100 ASSAY OF PHOSPHORUS: CPT

## 2024-08-29 PROCEDURE — 80048 BASIC METABOLIC PNL TOTAL CA: CPT

## 2024-08-29 PROCEDURE — 272N000138 HC DEVICE INFLATION CR6

## 2024-08-29 PROCEDURE — 710N000012 HC RECOVERY PHASE 2, PER MINUTE

## 2024-08-29 PROCEDURE — 370N000017 HC ANESTHESIA TECHNICAL FEE, PER MIN

## 2024-08-29 PROCEDURE — 83735 ASSAY OF MAGNESIUM: CPT

## 2024-08-29 PROCEDURE — 99100 ANES PT EXTEME AGE<1 YR&>70: CPT | Performed by: NURSE ANESTHETIST, CERTIFIED REGISTERED

## 2024-08-29 PROCEDURE — 99100 ANES PT EXTEME AGE<1 YR&>70: CPT | Performed by: ANESTHESIOLOGY

## 2024-08-29 PROCEDURE — 999N000141 HC STATISTIC PRE-PROCEDURE NURSING ASSESSMENT

## 2024-08-29 PROCEDURE — 64999 UNLISTED PX NERVOUS SYSTEM: CPT

## 2024-08-29 PROCEDURE — 85610 PROTHROMBIN TIME: CPT

## 2024-08-29 PROCEDURE — 86900 BLOOD TYPING SEROLOGIC ABO: CPT

## 2024-08-29 PROCEDURE — 272N000500 HC NEEDLE CR2

## 2024-08-29 PROCEDURE — 61790 TREAT TRIGEMINAL NERVE: CPT | Performed by: NURSE ANESTHETIST, CERTIFIED REGISTERED

## 2024-08-29 PROCEDURE — 250N000011 HC RX IP 250 OP 636: Performed by: ANESTHESIOLOGY

## 2024-08-29 PROCEDURE — 61790 TREAT TRIGEMINAL NERVE: CPT | Performed by: ANESTHESIOLOGY

## 2024-08-29 PROCEDURE — 85049 AUTOMATED PLATELET COUNT: CPT

## 2024-08-29 PROCEDURE — 258N000003 HC RX IP 258 OP 636: Performed by: ANESTHESIOLOGY

## 2024-08-29 PROCEDURE — 710N000010 HC RECOVERY PHASE 1, LEVEL 2, PER MIN

## 2024-08-29 PROCEDURE — 272N000209 HC BALLOON (NON-PTA) CR6

## 2024-08-29 PROCEDURE — 36415 COLL VENOUS BLD VENIPUNCTURE: CPT

## 2024-08-29 PROCEDURE — 250N000011 HC RX IP 250 OP 636: Performed by: NURSE ANESTHETIST, CERTIFIED REGISTERED

## 2024-08-29 RX ORDER — LABETALOL HYDROCHLORIDE 5 MG/ML
10 INJECTION, SOLUTION INTRAVENOUS
Status: COMPLETED | OUTPATIENT
Start: 2024-08-29 | End: 2024-08-29

## 2024-08-29 RX ORDER — ONDANSETRON 2 MG/ML
4 INJECTION INTRAMUSCULAR; INTRAVENOUS EVERY 30 MIN PRN
Status: DISCONTINUED | OUTPATIENT
Start: 2024-08-29 | End: 2024-08-29 | Stop reason: HOSPADM

## 2024-08-29 RX ORDER — PROPOFOL 10 MG/ML
INJECTION, EMULSION INTRAVENOUS PRN
Status: DISCONTINUED | OUTPATIENT
Start: 2024-08-29 | End: 2024-08-29

## 2024-08-29 RX ORDER — NALOXONE HYDROCHLORIDE 0.4 MG/ML
0.1 INJECTION, SOLUTION INTRAMUSCULAR; INTRAVENOUS; SUBCUTANEOUS
Status: DISCONTINUED | OUTPATIENT
Start: 2024-08-29 | End: 2024-08-29 | Stop reason: HOSPADM

## 2024-08-29 RX ORDER — LIDOCAINE HYDROCHLORIDE 20 MG/ML
INJECTION, SOLUTION INFILTRATION; PERINEURAL PRN
Status: DISCONTINUED | OUTPATIENT
Start: 2024-08-29 | End: 2024-08-29

## 2024-08-29 RX ORDER — DEXAMETHASONE SODIUM PHOSPHATE 4 MG/ML
4 INJECTION, SOLUTION INTRA-ARTICULAR; INTRALESIONAL; INTRAMUSCULAR; INTRAVENOUS; SOFT TISSUE
Status: DISCONTINUED | OUTPATIENT
Start: 2024-08-29 | End: 2024-08-29 | Stop reason: HOSPADM

## 2024-08-29 RX ORDER — METOPROLOL SUCCINATE 25 MG/1
25 TABLET, EXTENDED RELEASE ORAL ONCE
Status: COMPLETED | OUTPATIENT
Start: 2024-08-29 | End: 2024-08-29

## 2024-08-29 RX ORDER — OXYCODONE HYDROCHLORIDE 5 MG/1
5 TABLET ORAL EVERY 6 HOURS PRN
Qty: 12 TABLET | Refills: 0 | Status: SHIPPED | OUTPATIENT
Start: 2024-08-29 | End: 2024-09-01

## 2024-08-29 RX ORDER — SODIUM CHLORIDE, SODIUM LACTATE, POTASSIUM CHLORIDE, CALCIUM CHLORIDE 600; 310; 30; 20 MG/100ML; MG/100ML; MG/100ML; MG/100ML
INJECTION, SOLUTION INTRAVENOUS CONTINUOUS
Status: DISCONTINUED | OUTPATIENT
Start: 2024-08-29 | End: 2024-08-29 | Stop reason: HOSPADM

## 2024-08-29 RX ORDER — ONDANSETRON 2 MG/ML
INJECTION INTRAMUSCULAR; INTRAVENOUS PRN
Status: DISCONTINUED | OUTPATIENT
Start: 2024-08-29 | End: 2024-08-29

## 2024-08-29 RX ORDER — MEPERIDINE HYDROCHLORIDE 25 MG/ML
12.5 INJECTION INTRAMUSCULAR; INTRAVENOUS; SUBCUTANEOUS EVERY 5 MIN PRN
Status: DISCONTINUED | OUTPATIENT
Start: 2024-08-29 | End: 2024-08-29 | Stop reason: HOSPADM

## 2024-08-29 RX ORDER — ONDANSETRON 4 MG/1
4 TABLET, ORALLY DISINTEGRATING ORAL EVERY 30 MIN PRN
Status: DISCONTINUED | OUTPATIENT
Start: 2024-08-29 | End: 2024-08-29 | Stop reason: HOSPADM

## 2024-08-29 RX ORDER — LIDOCAINE 40 MG/G
CREAM TOPICAL
Status: DISCONTINUED | OUTPATIENT
Start: 2024-08-29 | End: 2024-08-29 | Stop reason: HOSPADM

## 2024-08-29 RX ORDER — LABETALOL HYDROCHLORIDE 5 MG/ML
10 INJECTION, SOLUTION INTRAVENOUS ONCE
Status: DISCONTINUED | OUTPATIENT
Start: 2024-08-29 | End: 2024-08-29

## 2024-08-29 RX ORDER — CETIRIZINE HYDROCHLORIDE 10 MG/1
10 TABLET ORAL DAILY
COMMUNITY

## 2024-08-29 RX ORDER — FENTANYL CITRATE 50 UG/ML
INJECTION, SOLUTION INTRAMUSCULAR; INTRAVENOUS PRN
Status: DISCONTINUED | OUTPATIENT
Start: 2024-08-29 | End: 2024-08-29

## 2024-08-29 RX ORDER — HYDROMORPHONE HYDROCHLORIDE 1 MG/ML
0.2 INJECTION, SOLUTION INTRAMUSCULAR; INTRAVENOUS; SUBCUTANEOUS EVERY 5 MIN PRN
Status: DISCONTINUED | OUTPATIENT
Start: 2024-08-29 | End: 2024-08-29 | Stop reason: HOSPADM

## 2024-08-29 RX ORDER — DEXAMETHASONE SODIUM PHOSPHATE 4 MG/ML
INJECTION, SOLUTION INTRA-ARTICULAR; INTRALESIONAL; INTRAMUSCULAR; INTRAVENOUS; SOFT TISSUE PRN
Status: DISCONTINUED | OUTPATIENT
Start: 2024-08-29 | End: 2024-08-29

## 2024-08-29 RX ORDER — HYDRALAZINE HYDROCHLORIDE 20 MG/ML
2.5-5 INJECTION INTRAMUSCULAR; INTRAVENOUS EVERY 10 MIN PRN
Status: DISCONTINUED | OUTPATIENT
Start: 2024-08-29 | End: 2024-08-29 | Stop reason: HOSPADM

## 2024-08-29 RX ORDER — OXYCODONE HYDROCHLORIDE 5 MG/1
5 TABLET ORAL
Status: DISCONTINUED | OUTPATIENT
Start: 2024-08-29 | End: 2024-08-29 | Stop reason: HOSPADM

## 2024-08-29 RX ORDER — ACETAMINOPHEN 325 MG/1
650 TABLET ORAL ONCE
Status: COMPLETED | OUTPATIENT
Start: 2024-08-29 | End: 2024-08-29

## 2024-08-29 RX ORDER — HYDROMORPHONE HYDROCHLORIDE 1 MG/ML
0.4 INJECTION, SOLUTION INTRAMUSCULAR; INTRAVENOUS; SUBCUTANEOUS EVERY 5 MIN PRN
Status: DISCONTINUED | OUTPATIENT
Start: 2024-08-29 | End: 2024-08-29 | Stop reason: HOSPADM

## 2024-08-29 RX ORDER — LABETALOL HYDROCHLORIDE 5 MG/ML
7.5 INJECTION, SOLUTION INTRAVENOUS ONCE
Status: COMPLETED | OUTPATIENT
Start: 2024-08-29 | End: 2024-08-29

## 2024-08-29 RX ORDER — ACETAMINOPHEN 500 MG
500-1000 TABLET ORAL EVERY 6 HOURS PRN
Qty: 12 TABLET | Refills: 0 | Status: SHIPPED | OUTPATIENT
Start: 2024-08-29 | End: 2024-09-01

## 2024-08-29 RX ORDER — FENTANYL CITRATE 50 UG/ML
25 INJECTION, SOLUTION INTRAMUSCULAR; INTRAVENOUS EVERY 5 MIN PRN
Status: DISCONTINUED | OUTPATIENT
Start: 2024-08-29 | End: 2024-08-29 | Stop reason: HOSPADM

## 2024-08-29 RX ORDER — FENTANYL CITRATE 50 UG/ML
50 INJECTION, SOLUTION INTRAMUSCULAR; INTRAVENOUS EVERY 5 MIN PRN
Status: DISCONTINUED | OUTPATIENT
Start: 2024-08-29 | End: 2024-08-29 | Stop reason: HOSPADM

## 2024-08-29 RX ORDER — FENTANYL CITRATE 50 UG/ML
25 INJECTION, SOLUTION INTRAMUSCULAR; INTRAVENOUS
Status: DISCONTINUED | OUTPATIENT
Start: 2024-08-29 | End: 2024-08-29 | Stop reason: HOSPADM

## 2024-08-29 RX ADMIN — Medication 200 MG: at 09:48

## 2024-08-29 RX ADMIN — METOPROLOL SUCCINATE 25 MG: 25 TABLET, EXTENDED RELEASE ORAL at 08:36

## 2024-08-29 RX ADMIN — DEXAMETHASONE SODIUM PHOSPHATE 4 MG: 4 INJECTION, SOLUTION INTRA-ARTICULAR; INTRALESIONAL; INTRAMUSCULAR; INTRAVENOUS; SOFT TISSUE at 09:25

## 2024-08-29 RX ADMIN — FENTANYL CITRATE 50 MCG: 50 INJECTION INTRAMUSCULAR; INTRAVENOUS at 09:09

## 2024-08-29 RX ADMIN — LABETALOL HYDROCHLORIDE 7.5 MG: 5 INJECTION, SOLUTION INTRAVENOUS at 08:19

## 2024-08-29 RX ADMIN — ONDANSETRON 4 MG: 2 INJECTION INTRAMUSCULAR; INTRAVENOUS at 09:43

## 2024-08-29 RX ADMIN — LIDOCAINE HYDROCHLORIDE 60 MG: 20 INJECTION, SOLUTION INFILTRATION; PERINEURAL at 09:09

## 2024-08-29 RX ADMIN — ACETAMINOPHEN 650 MG: 325 TABLET ORAL at 06:58

## 2024-08-29 RX ADMIN — PROPOFOL 60 MG: 10 INJECTION, EMULSION INTRAVENOUS at 09:09

## 2024-08-29 RX ADMIN — FENTANYL CITRATE 50 MCG: 50 INJECTION INTRAMUSCULAR; INTRAVENOUS at 09:40

## 2024-08-29 RX ADMIN — LABETALOL HYDROCHLORIDE 10 MG: 5 INJECTION, SOLUTION INTRAVENOUS at 10:13

## 2024-08-29 RX ADMIN — SODIUM CHLORIDE, POTASSIUM CHLORIDE, SODIUM LACTATE AND CALCIUM CHLORIDE: 600; 310; 30; 20 INJECTION, SOLUTION INTRAVENOUS at 09:00

## 2024-08-29 RX ADMIN — Medication 30 MG: at 09:10

## 2024-08-29 RX ADMIN — PROPOFOL 30 MG: 10 INJECTION, EMULSION INTRAVENOUS at 09:43

## 2024-08-29 ASSESSMENT — ACTIVITIES OF DAILY LIVING (ADL)
ADLS_ACUITY_SCORE: 36

## 2024-08-29 ASSESSMENT — VISUAL ACUITY
OU: BASELINE
OU: GLASSES;BASELINE

## 2024-08-29 NOTE — BRIEF OP NOTE
[facility]    Brief Operative Note    Pre-operative diagnosis: Trigeminal neuralgia [G50.0]  Post-operative diagnosis Same as pre-operative diagnosis    Procedure: ANESTHESIA, IN NON-OPERATING ROOM SETTING FOR A RIGHT SIDED BALLOON RHIZOTOMY@0800, N/A - Head    Surgeon: Surgeons and Role:     * GENERIC ANESTHESIA PROVIDER - Primary     * Cipriano Conklin MD - Assisting  Anesthesia: General   Estimated Blood Loss: Minimal    Drains: None  Specimens: * No specimens in log *  Findings:   None.  Complications: None.  Implants: * No implants in log *

## 2024-08-29 NOTE — PRE-PROCEDURE
Brief Neurosurgery Preoperative Exam Note    Patient evaluated in the preoperative area.     Tonia Diaz is a 89 year old female with a history of trigeminal neuralgia, and is status post right balloon rhizotomy in 2015 with Dr Salas, and a right-sided balloon rhizotomy in 2020 and 2022 with Dr Conklin. Now presenting with recurrent symptoms, with onset about a month ago after a dental procedure, and is scheduled to undergo a Right- sided balloon rhizotomy procedure today.   She is not on any aspirin or anticoagulants.  Today, she endorses persistent sharp pain to the right forehead and the infra orbital region, exacerbated by facial movement. No numbness reported.     The risks and benefits of the procedure were discussed, which include injury to the carotid artery, cranial nerve injury, bleeding in and around the brain, death and anesthesia dolorosa. She understood the risks and benefits and wished to proceed.     The patient provided consent for the above mentioned procedure.       NEUROLOGIC EXAM:  -- Awake; Alert; oriented x 3  -- Follows commands briskly  -- Speech fluent, spontaneous. No aphasia or dysarthria.  -- no gaze preference. No apparent hemineglect.  Cranial Nerves:  -- PERRL 3-2mm bilat and brisk, extraocular movements intact  -- face symmetrical, tongue midline  -- sensory V1-V3 intact bilaterally  -- palate elevates symmetrically, uvula midline  -- hearing grossly intact bilat    Extremities:  -- Full strength 5/5 throughout all 4 extremities  -- Sensation intact to light touch      Love Zabala MD, PGY-1  Department of Neurosurgery  Pager: [phone]    Please contact neurosurgery resident on call with questions.    Dial * * *[phone], enter [phone] when prompted.

## 2024-08-29 NOTE — ANESTHESIA PREPROCEDURE EVALUATION
Anesthesia Pre-Procedure Evaluation    Patient: Tonia Meeks   MRN: [id number] : 1934        Procedure : Procedure(s):  ANESTHESIA, IN NON-OPERATING ROOM SETTING FOR A RIGHT SIDED BALLOON RHIZOTOMY@0800          Past Medical History:   Diagnosis Date    CAD (coronary artery disease)     Fracture of right wrist 2016    from a fall    Fracture, ribs 2016    from a fall    Hyperlipidemia     Hypertension     Macular degeneration, bilateral     MI (myocardial infarction) (H)     Thoracic spine fracture (H) 2016    Trigeminal neuralgia       Past Surgical History:   Procedure Laterality Date    APPENDECTOMY      BALLOON COMPRESSION RHIZOTOMY Right 2015    Procedure: BALLOON COMPRESSION RHIZOTOMY;  Surgeon: Nahid Salas MD;  Location: [facility] OR    CARDIAC SURGERY      stent    ENT SURGERY      tonsillectomy    EYE SURGERY      cataracts    GYN SURGERY      hysterectomy    HC ECP WITH CATARACT SURGERY Bilateral     IR RHIZOTOMY  2020    IR RHIZOTOMY  2022    STENT      thoracic spine fusion with instrumentation  2016    due to a fall      Allergies   Allergen Reactions    Sulfa Antibiotics Unknown      Social History     Tobacco Use    Smoking status: Never    Smokeless tobacco: Never   Substance Use Topics    Alcohol use: Yes     Comment: couple of drinks/week      Wt Readings from Last 1 Encounters:   24 50.8 kg (111 lb 15.9 oz)        Anesthesia Evaluation   Pt has had prior anesthetic.     No history of anesthetic complications       ROS/MED HX  ENT/Pulmonary:  - neg pulmonary ROS     Neurologic: Comment: Multiple level T-spine fx 2/2 fall s/p fusion    Trigeminal neuralgia s/p rhizotomies x2      Cardiovascular: Comment: STEMI 2/2 CAD s/p ARA to pLAD . Remaining lesion (75% pRCA) NOT stented. No symptoms    Single episode of afib 2016 after T spine surgery, no episodes since    (+) Dyslipidemia hypertension- -  CAD -  - -                                       METS/Exercise Tolerance: 3 - Able to walk 1-2 blocks without stopping    Hematologic:       Musculoskeletal:       GI/Hepatic:    (-) GERD   Renal/Genitourinary:       Endo:       Psychiatric/Substance Use:       Infectious Disease:       Malignancy:       Other:            Physical Exam    Airway        Mallampati: III   TM distance: > 3 FB   Neck ROM: limited   Mouth opening: > 3 cm    Respiratory Devices and Support         Dental     Comment: Patient reports no loose or chipped teeth        Cardiovascular          Rhythm and rate: regular and normal     Pulmonary           breath sounds clear to auscultation           OUTSIDE LABS:  CBC:   Lab Results   Component Value Date    WBC 6.8 08/29/2024    WBC 8.5 08/22/2024    HGB 14.3 08/29/2024    HGB 13.7 08/22/2024    HCT 42.8 08/29/2024    HCT 42.3 08/22/2024     08/29/2024     08/22/2024     BMP:   Lab Results   Component Value Date     08/29/2024     08/22/2024    POTASSIUM 4.2 08/29/2024    POTASSIUM 3.8 08/22/2024    CHLORIDE 101 08/29/2024    CHLORIDE 106 08/22/2024    CO2 24 08/29/2024    CO2 26 08/22/2024    BUN 12.6 08/29/2024    BUN 10.6 08/22/2024    CR 0.90 08/29/2024    CR 0.90 08/22/2024    GLC 98 08/29/2024     (H) 08/22/2024     COAGS:   Lab Results   Component Value Date    PTT 28 01/04/2022    INR 0.94 08/29/2024     POC:   Lab Results   Component Value Date    BGM 92 01/30/2020     HEPATIC:   Lab Results   Component Value Date    ALBUMIN 4.1 08/22/2024    PROTTOTAL 6.8 08/22/2024    ALT 10 08/22/2024    AST 18 08/22/2024    ALKPHOS 83 08/22/2024    BILITOTAL 0.3 08/22/2024     OTHER:   Lab Results   Component Value Date    NALDO 9.2 08/29/2024    PHOS 3.2 08/29/2024    MAG 2.0 08/29/2024       Anesthesia Plan    ASA Status:  3    NPO Status:  NPO Appropriate    Anesthesia Type: General.     - Airway: ETT              Consents    Anesthesia Plan(s) and associated risks, benefits, and realistic alternatives  discussed. Questions answered and patient/representative(s) expressed understanding.     - Discussed:     - Discussed with:  Patient      - Extended Intubation/Ventilatory Support Discussed: No.      - Patient is DNR/DNI Status: No     Use of blood products discussed: No .     Postoperative Care            Comments:    Other Comments: Discussed increased risk of postop delirium given age and hearing issues           Azar Fragoso MD    I have reviewed the pertinent notes and labs in the chart from the past 30 days and (re)examined the patient.  Any updates or changes from those notes are reflected in this note.

## 2024-08-29 NOTE — ANESTHESIA POSTPROCEDURE EVALUATION
Patient: Tonia Meeks    Procedure: Procedure(s):  ANESTHESIA, IN NON-OPERATING ROOM SETTING FOR A RIGHT SIDED BALLOON RHIZOTOMY@0800       Anesthesia Type:  No value filed.    Note:  Disposition: Outpatient   Postop Pain Control: Uneventful            Sign Out: Well controlled pain   PONV: No   Neuro/Psych: Uneventful            Sign Out: Acceptable/Baseline neuro status   Airway/Respiratory: Uneventful            Sign Out: Acceptable/Baseline resp. status   CV/Hemodynamics: Uneventful            Sign Out: Acceptable CV status; No obvious hypovolemia; No obvious fluid overload   Other NRE: NONE   DID A NON-ROUTINE EVENT OCCUR?            Last vitals:  Vitals Value Taken Time   /83 08/29/24 1030   Temp 36.4  C (97.6  F) 08/29/24 1004   Pulse 71 08/29/24 1039   Resp 21 08/29/24 1039   SpO2 95 % 08/29/24 1039   Vitals shown include unfiled device data.    Electronically Signed By: Afshan Lange MD  August 29, 2024  10:39 AM

## 2024-08-29 NOTE — PROGRESS NOTES
Patient Name: Tonia Meeks  Medical Record Number: [id number]  Today's Date: 8/29/2024    Procedure: Right sided balloon rhizotomy  Proceduralist: Dr. Conklin  Pathology present: n/a    Procedure Start: 0935  Procedure end: 0944  Sedation medications administered: per anesthesia     Report given to: PACU Jag CASTELLANO  : n/a    Other Notes: Pt arrived to IR room 3 from . Consent reviewed. Pt denies any questions or concerns regarding procedure. Pt positioned supine and monitored per protocol. Pt tolerated procedure without any noted complications. Pt transferred back to PACU.

## 2024-08-29 NOTE — OP NOTE
Neurosurgery operative report    Surgeon: Cipriano Conklin MD    Preoperative diagnosis: Right-sided recurrent trigeminal neuralgia    Postoperative diagnosis: Same    Procedure performed: Right-sided stereotactic balloon rhizotomy    Anesthesia: General Endotracheal anesthesia    Estimated blood loss: 0    Complications: None immediately apparent    Preoperative history: Tonia is a 89-year-old woman who has a history of trigeminal neuralgia.  Over the last 10 years she has had balloon rhizotomies twice with good treatment effect.  Over the last 6 months she has had recurrence of her typical trigeminal pain.  We had seen her in clinic and discussed with her treatment options including adjusting medication, microvascular decompression, balloon rhizotomy, radiofrequency rhizotomy, radiosurgery.  We discussed pros and cons of these different treatment options including risks and benefits.  Ultimately she wanted to proceed with a repeat balloon rhizotomy.  She understood the risks and benefits.    Today I saw her in preop.  I confirmed that she had right-sided recurrent trigeminal neuralgia.  I again discussed all the treatment options listed above.  I discussed the pros and cons and risks and benefits of all these different procedures.  Ultimately she again wished to proceed with the balloon rhizotomy.  She understood the expected numbness and the potential for anesthesia dolorosa.    Procedural details: Tonia was brought to the angiography suite and positioned in supine fashion on the angiography table.  Hartel's landmarks were marked on the right side of the face which was then prepped and draped in the usual sterile fashion.  After a timeout we then lined up AP and lateral fluoroscopy to identify the foramen ovale.  I advanced a Jamshidi needle up to the foramen and then withdrew the inner stylette and advanced a longer stylette through the foramen up to the clival line.  This created a path for our 4 Malt[place] Conchis  balloon which was then placed into the needle and up through the foramen ovale up to the clival line.  I then remove the inner stylette of this and then inflated the balloon to 1.4 cathleen of pressure and held this for a minute and a half.  We then deflated the balloon and then remove the balloon and Jamshidi together.  We held pressure on the cheek for 5 minutes.    She was extubated returned to the hospital [facility] and then taken to recovery.    I attest I was present for the entire duration of the procedure.

## 2024-08-29 NOTE — ANESTHESIA CARE TRANSFER NOTE
Patient: Tonia Meeks    Procedure: Procedure(s):  ANESTHESIA, IN NON-OPERATING ROOM SETTING FOR A RIGHT SIDED BALLOON RHIZOTOMY@0800       Diagnosis: Trigeminal neuralgia [G50.0]  Diagnosis Additional Information: No value filed.    Anesthesia Type:   No value filed.     Note:    Oropharynx: oropharynx clear of all foreign objects and spontaneously breathing  Level of Consciousness: awake  Oxygen Supplementation: face mask  Level of Supplemental Oxygen (L/min / FiO2): 6  Independent Airway: airway patency satisfactory and stable  Dentition: dentition unchanged  Vital Signs Stable: post-procedure vital signs reviewed and stable  Report to RN Given: handoff report given  Patient transferred to: PACU    Handoff Report: Identifed the Patient, Identified the Reponsible Provider, Reviewed the pertinent medical history, Discussed the surgical course, Reviewed Intra-OP anesthesia mangement and issues during anesthesia, Set expectations for post-procedure period and Allowed opportunity for questions and acknowledgement of understanding  Vitals:  Vitals Value Taken Time   /94 08/29/24 1004   Temp     Pulse 78 08/29/24 1008   Resp 15 08/29/24 1008   SpO2 96 % 08/29/24 1008   Vitals shown include unfiled device data.    Electronically Signed By: KENYA Keen CRNA  August 29, 2024  10:09 AM

## 2024-08-29 NOTE — ANESTHESIA PROCEDURE NOTES
Airway       Patient location during procedure: OR       Procedure Start/Stop Times: 8/29/2024 9:13 AM  Staff -        CRNA: Myra De Jesus APRN CRNA       Performed By: CRNA  Consent for Airway        Urgency: elective  Indications and Patient Condition       Indications for airway management: gio-procedural       Induction type:intravenous       Mask difficulty assessment: 1 - vent by mask    Final Airway Details       Final airway type: endotracheal airway       Successful airway: ETT - single and Oral  Endotracheal Airway Details        ETT size (mm): 6.5       Cuffed: yes       Cuff volume (mL): 4       Successful intubation technique: direct laryngoscopy       DL Blade Type: Diaz 2       Grade View of Cords: 2 (2B)       Adjucts: stylet       Position: Right       Measured from: gums/teeth       Secured at (cm): 20       Bite block used: None    Post intubation assessment        Placement verified by: capnometry, equal breath sounds and chest rise        Number of attempts at approach: 1       Number of other approaches attempted: 1 (cricoid pressure)       Secured with: tape       Ease of procedure: easy       Dentition: Intact and Unchanged    Medication(s) Administered   Medication Administration Time: 8/29/2024 9:13 AM    Additional Comments       Limited neck extension.  Grade 2b view.  Recommend videolaryngoscope in future.

## 2024-08-29 NOTE — DISCHARGE INSTRUCTIONS
Contacting your Doctor -   To contact a doctor, call Dr. Conklin's neurosurgery clinic at [phone]  or:  [phone] and ask for the resident on call for Neurology (answered 24 hours a day)   Emergency Department:  [facility]: [phone]  [facility]: [phone] 911 if you are in need of immediate or emergent help

## 2024-11-08 ENCOUNTER — OFFICE VISIT (OUTPATIENT)
Dept: NEUROSURGERY | Facility: CLINIC | Age: 89
End: 2024-11-08
Attending: NURSE PRACTITIONER
Payer: COMMERCIAL

## 2024-11-08 VITALS — HEART RATE: 70 BPM | DIASTOLIC BLOOD PRESSURE: 78 MMHG | OXYGEN SATURATION: 96 % | SYSTOLIC BLOOD PRESSURE: 171 MMHG

## 2024-11-08 DIAGNOSIS — G50.0 TRIGEMINAL NEURALGIA: Primary | ICD-10-CM

## 2024-11-08 PROCEDURE — 99024 POSTOP FOLLOW-UP VISIT: CPT | Performed by: NURSE PRACTITIONER

## 2024-11-08 NOTE — PATIENT INSTRUCTIONS
Week 1: Take Gabapentin 100 mg (1 tablet) and Oxcarbazepine 150 mg (1 tablet) at bedtime.     Week 2: Take Oxcarbazepine 150 mg twice daily. Gabapentin 100 mg at bedtime.     Follow-up with me in 3 weeks.

## 2024-11-08 NOTE — LETTER
"2024       RE: Tonia Meeks  [address]  [facility] [address]     Dear Colleague,    Thank you for referring your patient, Tonia Meeks, to the [facility] [place] [facility]. Please see a copy of my visit note below.    [facility]  Department of Neurosurgery      Name: Tonia Meeks  MRN: [id number]  Age: 89 year old  : 1934  Referring provider: Dilia Sequeira  2024      Chief Complaint:   Right Trigeminal Neuralgia  S/p Right balloon rhizotomy in 2024  Recurrent symptoms    History of Present Illness:   Tonia Meeks is a 89 year old female with a history of hypertension, hyperlipidemia who is here today for recurrent symptoms of right-sided trigeminal neuralgia.     Patient has a long term h/o right sided trigeminal neuralgia. She was seen by Agustín Salas and Rubin in the past. Patient had 3 right sided balloon rhizotomy procedures in the past, in , in 2020 and most recently in 2024. Today, patient presents for the evaluation with her daughter.     Patient reports almost 3 weeks of complete pain relief after her most recent balloon rhizotomy in 2024. Since then, she started to have TN symptoms in right V1 and V2 area triggered by chewing, coughing or yawning. These are episodic in nature and similar to what she had in the past prior to her procedure.     She denies any facial numbness post balloon rhizotomy.     She uses Gabapentin 200 mg at bedtime \"to help her sleep\". I had prescribed Oxcarbazepine for her in 2024, but since she was able to have the procedure in 2024, she never started it.     She lives in an independent living facility. She walks everyday. She ambulates independently.       Review of Systems:   Pertinent items are noted in HPI or as in patient entered ROS below, remainder of complete ROS is negative.        No data to display             "     Physical Exam:   BP (!) 171/78 (BP Location: Left arm, Patient Position: Sitting, Cuff Size: Adult Regular)   Pulse 70   SpO2 96%    General: No acute distress.    Neuro: The patient is fully oriented. Speech is normal. Gait is normal.  Psych: Normal mood and affect. Behavior is normal.      Assessment:  Right Trigeminal Neuralgia  S/p Right balloon rhizotomy in 8/2024  Recurrent symptoms      Plan:  Patient presenting with recurrent TN symptoms in right V1-2 distributions. No residual numbness from recent balloon rhizotomy. We will start her on Oxcarbazepine 150 mg and gradually increase the dose to 150 mg twice daily. Patient to follow-up with me in 3 weeks.        I spent 32 minutes on patient care activities related to this encounter on the date of service, including time spent reviewing the chart, obtaining history and examination and in counseling the patient, and in documentation in the electronic medical record.      Dilai ZAMBRANO CNP  Department of Neurosurgery      Again, thank you for allowing me to participate in the care of your patient.      Sincerely,    KENYA Mahoney CNP

## 2024-11-08 NOTE — PROGRESS NOTES
"[facility]  Department of Neurosurgery      Name: Tonia Meeks  MRN: [id number]  Age: 89 year old  : 1934  Referring provider: Dilia Sequeira  2024      Chief Complaint:   Right Trigeminal Neuralgia  S/p Right balloon rhizotomy in 2024  Recurrent symptoms    History of Present Illness:   Tonia Meeks is a 89 year old female with a history of hypertension, hyperlipidemia who is here today for recurrent symptoms of right-sided trigeminal neuralgia.     Patient has a long term h/o right sided trigeminal neuralgia. She was seen by Agustín Salas and Rubin in the past. Patient had 3 right sided balloon rhizotomy procedures in the past, in , in 2020 and most recently in 2024. Today, patient presents for the evaluation with her daughter.     Patient reports almost 3 weeks of complete pain relief after her most recent balloon rhizotomy in 2024. Since then, she started to have TN symptoms in right V1 and V2 area triggered by chewing, coughing or yawning. These are episodic in nature and similar to what she had in the past prior to her procedure.     She denies any facial numbness post balloon rhizotomy.     She uses Gabapentin 200 mg at bedtime \"to help her sleep\". I had prescribed Oxcarbazepine for her in 2024, but since she was able to have the procedure in 2024, she never started it.     She lives in an independent living facility. She walks everyday. She ambulates independently.       Review of Systems:   Pertinent items are noted in HPI or as in patient entered ROS below, remainder of complete ROS is negative.        No data to display                 Physical Exam:   BP (!) 171/78 (BP Location: Left arm, Patient Position: Sitting, Cuff Size: Adult Regular)   Pulse 70   SpO2 96%    General: No acute distress.    Neuro: The patient is fully oriented. Speech is normal. Gait is normal.  Psych: Normal mood and affect. Behavior is normal.      Assessment:  Right " Trigeminal Neuralgia  S/p Right balloon rhizotomy in 8/2024  Recurrent symptoms      Plan:  Patient presenting with recurrent TN symptoms in right V1-2 distributions. No residual numbness from recent balloon rhizotomy. We will start her on Oxcarbazepine 150 mg and gradually increase the dose to 150 mg twice daily. Patient to follow-up with me in 3 weeks.        I spent 32 minutes on patient care activities related to this encounter on the date of service, including time spent reviewing the chart, obtaining history and examination and in counseling the patient, and in documentation in the electronic medical record.      Dilia ZAMBRANO, CNP  Department of Neurosurgery

## 2024-12-11 ENCOUNTER — TELEPHONE (OUTPATIENT)
Dept: NEUROSURGERY | Facility: CLINIC | Age: 89
End: 2024-12-11
Payer: COMMERCIAL

## 2024-12-11 NOTE — TELEPHONE ENCOUNTER
Left Voicemail (1st Attempt) for the patient to call back and schedule the following:    Appointment type: Return Facial Pain [place]  Provider: Dilia Sequeira  Return date: next available  Specialty phone number: [phone]  Additional appointment(s) needed: NA  Additonal Notes: Return pain Trigeminal neuralgia     Kathy Mullen on 12/11/2024 at 1:48 PM

## 2025-01-21 ENCOUNTER — TELEPHONE (OUTPATIENT)
Dept: NEUROSURGERY | Facility: CLINIC | Age: OVER 89
End: 2025-01-21
Payer: COMMERCIAL

## 2025-01-21 NOTE — TELEPHONE ENCOUNTER
Son, Elio calling to cancel appointment tomorrow 1/22 with Dilia Sequeira NP  Patient has the flu and will callback to schedule when available.  Note sent to scheduling\    Voices understanding

## 2025-01-21 NOTE — TELEPHONE ENCOUNTER
Left Voicemail (2nd Attempt) for the patient to call back and schedule the following:    Appointment type: Rtn facial pain [place] - in person or virtual  Provider: Dilia Sequeira  Return date: Next available  Specialty phone number: [phone]  Additional appointment(s) needed: N/A  Additonal Notes: TN.

## (undated) RX ORDER — FENTANYL CITRATE 50 UG/ML
INJECTION, SOLUTION INTRAMUSCULAR; INTRAVENOUS
Status: DISPENSED
Start: 2022-01-04

## (undated) RX ORDER — FENTANYL CITRATE 50 UG/ML
INJECTION, SOLUTION INTRAMUSCULAR; INTRAVENOUS
Status: DISPENSED
Start: 2020-01-30

## (undated) RX ORDER — PROPOFOL 10 MG/ML
INJECTION, EMULSION INTRAVENOUS
Status: DISPENSED
Start: 2024-08-29

## (undated) RX ORDER — LABETALOL HYDROCHLORIDE 5 MG/ML
INJECTION, SOLUTION INTRAVENOUS
Status: DISPENSED
Start: 2024-08-29

## (undated) RX ORDER — FENTANYL CITRATE-0.9 % NACL/PF 10 MCG/ML
PLASTIC BAG, INJECTION (ML) INTRAVENOUS
Status: DISPENSED
Start: 2024-08-29

## (undated) RX ORDER — ONDANSETRON 2 MG/ML
INJECTION INTRAMUSCULAR; INTRAVENOUS
Status: DISPENSED
Start: 2022-01-04

## (undated) RX ORDER — ACETAMINOPHEN 325 MG/1
TABLET ORAL
Status: DISPENSED
Start: 2024-08-29

## (undated) RX ORDER — SODIUM CHLORIDE, SODIUM LACTATE, POTASSIUM CHLORIDE, CALCIUM CHLORIDE 600; 310; 30; 20 MG/100ML; MG/100ML; MG/100ML; MG/100ML
INJECTION, SOLUTION INTRAVENOUS
Status: DISPENSED
Start: 2022-01-04

## (undated) RX ORDER — PROPOFOL 10 MG/ML
INJECTION, EMULSION INTRAVENOUS
Status: DISPENSED
Start: 2022-01-04

## (undated) RX ORDER — FENTANYL CITRATE-0.9 % NACL/PF 10 MCG/ML
PLASTIC BAG, INJECTION (ML) INTRAVENOUS
Status: DISPENSED
Start: 2022-01-04

## (undated) RX ORDER — DEXAMETHASONE SODIUM PHOSPHATE 4 MG/ML
INJECTION, SOLUTION INTRA-ARTICULAR; INTRALESIONAL; INTRAMUSCULAR; INTRAVENOUS; SOFT TISSUE
Status: DISPENSED
Start: 2022-01-04

## (undated) RX ORDER — LIDOCAINE HYDROCHLORIDE 20 MG/ML
INJECTION, SOLUTION EPIDURAL; INFILTRATION; INTRACAUDAL; PERINEURAL
Status: DISPENSED
Start: 2022-01-04

## (undated) RX ORDER — DEXAMETHASONE SODIUM PHOSPHATE 4 MG/ML
INJECTION, SOLUTION INTRA-ARTICULAR; INTRALESIONAL; INTRAMUSCULAR; INTRAVENOUS; SOFT TISSUE
Status: DISPENSED
Start: 2024-08-29

## (undated) RX ORDER — ONDANSETRON 2 MG/ML
INJECTION INTRAMUSCULAR; INTRAVENOUS
Status: DISPENSED
Start: 2024-08-29

## (undated) RX ORDER — LIDOCAINE HYDROCHLORIDE 10 MG/ML
INJECTION, SOLUTION EPIDURAL; INFILTRATION; INTRACAUDAL; PERINEURAL
Status: DISPENSED
Start: 2024-08-29

## (undated) RX ORDER — ROCURONIUM BROMIDE 50 MG/5 ML
SYRINGE (ML) INTRAVENOUS
Status: DISPENSED
Start: 2022-01-04

## (undated) RX ORDER — FENTANYL CITRATE 50 UG/ML
INJECTION, SOLUTION INTRAMUSCULAR; INTRAVENOUS
Status: DISPENSED
Start: 2024-08-29

## (undated) RX ORDER — GLYCOPYRROLATE 0.2 MG/ML
INJECTION, SOLUTION INTRAMUSCULAR; INTRAVENOUS
Status: DISPENSED
Start: 2022-01-04